# Patient Record
Sex: FEMALE | Race: WHITE | NOT HISPANIC OR LATINO | Employment: FULL TIME | URBAN - METROPOLITAN AREA
[De-identification: names, ages, dates, MRNs, and addresses within clinical notes are randomized per-mention and may not be internally consistent; named-entity substitution may affect disease eponyms.]

---

## 2017-03-13 ENCOUNTER — PATIENT MESSAGE (OUTPATIENT)
Dept: OBSTETRICS AND GYNECOLOGY | Facility: CLINIC | Age: 52
End: 2017-03-13

## 2017-04-21 ENCOUNTER — OFFICE VISIT (OUTPATIENT)
Dept: OBSTETRICS AND GYNECOLOGY | Facility: CLINIC | Age: 52
End: 2017-04-21
Payer: COMMERCIAL

## 2017-04-21 VITALS
HEIGHT: 62 IN | WEIGHT: 132 LBS | BODY MASS INDEX: 24.29 KG/M2 | DIASTOLIC BLOOD PRESSURE: 68 MMHG | SYSTOLIC BLOOD PRESSURE: 120 MMHG

## 2017-04-21 DIAGNOSIS — Z01.419 WELL WOMAN EXAM WITH ROUTINE GYNECOLOGICAL EXAM: Primary | ICD-10-CM

## 2017-04-21 DIAGNOSIS — N90.7 INCLUSION CYST OF VULVA: ICD-10-CM

## 2017-04-21 DIAGNOSIS — Z12.31 ENCOUNTER FOR SCREENING MAMMOGRAM FOR BREAST CANCER: ICD-10-CM

## 2017-04-21 PROCEDURE — 99396 PREV VISIT EST AGE 40-64: CPT | Mod: S$GLB,,, | Performed by: OBSTETRICS & GYNECOLOGY

## 2017-04-21 PROCEDURE — 99999 PR PBB SHADOW E&M-EST. PATIENT-LVL II: CPT | Mod: PBBFAC,,, | Performed by: OBSTETRICS & GYNECOLOGY

## 2017-04-21 PROCEDURE — 88175 CYTOPATH C/V AUTO FLUID REDO: CPT

## 2017-04-22 RX ORDER — PROGESTERONE 100 MG/1
100 CAPSULE ORAL NIGHTLY
Qty: 30 CAPSULE | Refills: 11 | Status: SHIPPED | OUTPATIENT
Start: 2017-04-22 | End: 2018-01-09 | Stop reason: SDUPTHER

## 2017-04-22 RX ORDER — ESTRADIOL 2 MG/1
2 TABLET ORAL DAILY
Qty: 30 TABLET | Refills: 11 | Status: SHIPPED | OUTPATIENT
Start: 2017-04-22 | End: 2018-01-09 | Stop reason: SDUPTHER

## 2017-04-25 NOTE — PROGRESS NOTES
HPI:  52 y.o. female patient  presents today for annual exam.  She is having very spaced out and light menses every 2-5 months.  She is using estrace and prometrium for HRT, which is controlling her symptoms well.   has had a vasectomy for contraception.  She is c/o vulvar cyst that has been present for a long time, but is causing irritation.      Past Medical History:   Diagnosis Date    Hypertension     Ovarian cyst        Past Surgical History:   Procedure Laterality Date    breast implants      COLON SURGERY      SINUS SURGERY         REVIEW OF SYSTEMS:  GENERAL:  No fever, chills, fatigue, or weight loss  ABDOMEN:  Normal appetite, no weight loss or abdominal pain  URINARY:  No flank pain, dysuria, or hematuria  REPRODUCTIVE:  No abnormal vaginal bleeding  BREASTS:  No tenderness, masses, or nipple discharge noted of breasts    PHYSICAL EXAM:    APPEARANCE:  Well nourished, well developed, in no acute distress  NECK:  Symmetric without masses or thyromegaly  BREASTS:  Symmetrical, no skin changes or visible lesions.  No palpable masses, nipple discharge, or adenopathy bilaterally.  ABDOMEN:  Soft, no tenderness or masses, no distension noted  PELVIC:  VULVA:  1 cm inclusion cyst present on left labia minora, lateral to clitoris.  No other lesions.  Normal female genitalia  URETHRAL MEATUS:  Normal size and location.  No lesions.  No prolapse  URETHRA:  No masses, tenderness, prolapse, or scarring  VAGINA:  No lesions or discharge.  No significant cystocele or rectocele  CERVIX:  No lesions.  Normal diameter, no cervical motion tenderness.  UTERUS:  4-6 week size, regular shape, mobile, non-tender, normal position, good support  ADNEXA:  No masses or tenderness  ANUS AND PERINEUM:  No lesions.  No external hemorrhoids    1. Well woman exam with routine gynecological exam  Liquid-based pap smear, screening   2. Encounter for screening mammogram for breast cancer  Mammo Digital Screening Bilat  with CAD   3. Inclusion cyst of vulva           PLAN:  Patient counseled on options of observation vs removal for inclusion cyst.  She desires removal of it, due to irritation in the area.  She will schedule an appt for removal of this.  Patient counseled regarding recommended routine health maintenance for her age.

## 2017-05-03 ENCOUNTER — HOSPITAL ENCOUNTER (OUTPATIENT)
Dept: RADIOLOGY | Facility: HOSPITAL | Age: 52
Discharge: HOME | End: 2017-05-03
Attending: OBSTETRICS & GYNECOLOGY
Payer: COMMERCIAL

## 2017-05-03 DIAGNOSIS — Z12.31 ENCOUNTER FOR SCREENING MAMMOGRAM FOR BREAST CANCER: ICD-10-CM

## 2017-05-03 PROCEDURE — 77067 SCR MAMMO BI INCL CAD: CPT | Mod: 26,,, | Performed by: RADIOLOGY

## 2017-05-03 PROCEDURE — 77067 SCR MAMMO BI INCL CAD: CPT | Mod: TC

## 2017-05-03 PROCEDURE — 77063 BREAST TOMOSYNTHESIS BI: CPT | Mod: 26,,, | Performed by: RADIOLOGY

## 2017-06-08 ENCOUNTER — PROCEDURE VISIT (OUTPATIENT)
Dept: OBSTETRICS AND GYNECOLOGY | Facility: CLINIC | Age: 52
End: 2017-06-08
Payer: COMMERCIAL

## 2017-06-08 VITALS
WEIGHT: 136.44 LBS | SYSTOLIC BLOOD PRESSURE: 120 MMHG | HEIGHT: 62 IN | BODY MASS INDEX: 25.11 KG/M2 | DIASTOLIC BLOOD PRESSURE: 78 MMHG

## 2017-06-08 DIAGNOSIS — D17.79 LIPOMA OF GROIN: Primary | ICD-10-CM

## 2017-06-08 DIAGNOSIS — N90.7 VULVAR CYST: ICD-10-CM

## 2017-06-08 PROCEDURE — 56605 BIOPSY OF VULVA/PERINEUM: CPT | Mod: S$GLB,,, | Performed by: OBSTETRICS & GYNECOLOGY

## 2017-06-08 PROCEDURE — 88304 TISSUE EXAM BY PATHOLOGIST: CPT | Performed by: PATHOLOGY

## 2017-06-08 PROCEDURE — 88304 TISSUE EXAM BY PATHOLOGIST: CPT | Mod: 26,,, | Performed by: PATHOLOGY

## 2017-06-08 RX ORDER — HYDROCODONE BITARTRATE AND ACETAMINOPHEN 5; 325 MG/1; MG/1
1 TABLET ORAL EVERY 6 HOURS PRN
Qty: 15 TABLET | Refills: 0 | Status: CANCELLED | OUTPATIENT
Start: 2017-06-08 | End: 2017-06-18

## 2017-06-08 RX ORDER — ACETAMINOPHEN AND CODEINE PHOSPHATE 300; 30 MG/1; MG/1
1 TABLET ORAL EVERY 6 HOURS
Qty: 10 TABLET | Refills: 0 | Status: SHIPPED | OUTPATIENT
Start: 2017-06-08 | End: 2019-02-26

## 2017-06-08 NOTE — PROCEDURES
Procedures     VULVAR CYST EXCISION:  Pre-Vulvar cyst I&D Counseling:  The patient was informed of the risk of bleeding, pain and infection.  She was counseled on the conservative treatment as well as operative outpatient management, and she agrees to proceed with in-office incision and drainage.    Exam:  There is a 1 cm cyst of the left labium majus.  Normal hair distribution.  Adequate perineal body and normal urethral meatus.  Vaginal moist and well-rugated.    Procedure:  A time out was performed.  The base of the cyst was injected with 2 ml of 1% lidocaine with epinephrine.  A stab wound was made into the cyst with a #11 blade, and a small hemostat was used to excise the cyst, which turned out to be a vulvar lipoma.  Silver nitrate was used to obtain good hemostasis.  The patient tolerated the procedure well.    Assessment:  Excision of a vulvar lipoma    Post excision of vulvar lipoma counseling:  The patient was counseled to manage pain with NSAIDs or hydrocodone.  She was advised to expect a bloody yellowish discharge for 24-48 hours.  The patient was advised to avoid vaginal intercourse, douching, and tampons for at least a week.  She was counseled to call for evaluation in the event of heavy bleeding, fever greater than 101.0F, or worsening pain and redness at the excision site.  Counseling lasted about 15 minutes, and all questions were answered.    Follow-up:  As needed.

## 2017-06-13 ENCOUNTER — PATIENT MESSAGE (OUTPATIENT)
Dept: OBSTETRICS AND GYNECOLOGY | Facility: CLINIC | Age: 52
End: 2017-06-13

## 2017-06-14 RX ORDER — AMOXICILLIN AND CLAVULANATE POTASSIUM 875; 125 MG/1; MG/1
1 TABLET, FILM COATED ORAL 2 TIMES DAILY
Qty: 14 TABLET | Refills: 0 | Status: SHIPPED | OUTPATIENT
Start: 2017-06-14 | End: 2017-06-21

## 2017-07-12 ENCOUNTER — PATIENT MESSAGE (OUTPATIENT)
Dept: OBSTETRICS AND GYNECOLOGY | Facility: CLINIC | Age: 52
End: 2017-07-12

## 2017-07-13 NOTE — TELEPHONE ENCOUNTER
Please schedule patient for follow up appt.  I can see her today or tomorrow if that works for her.

## 2017-07-21 ENCOUNTER — OFFICE VISIT (OUTPATIENT)
Dept: OBSTETRICS AND GYNECOLOGY | Facility: CLINIC | Age: 52
End: 2017-07-21
Payer: COMMERCIAL

## 2017-07-21 VITALS
HEIGHT: 62 IN | BODY MASS INDEX: 25.11 KG/M2 | DIASTOLIC BLOOD PRESSURE: 80 MMHG | WEIGHT: 136.44 LBS | SYSTOLIC BLOOD PRESSURE: 120 MMHG

## 2017-07-21 DIAGNOSIS — N90.89 VULVAR HEMATOMA: Primary | ICD-10-CM

## 2017-07-21 PROCEDURE — 99212 OFFICE O/P EST SF 10 MIN: CPT | Mod: S$GLB,,, | Performed by: OBSTETRICS & GYNECOLOGY

## 2017-07-21 PROCEDURE — 99999 PR PBB SHADOW E&M-EST. PATIENT-LVL II: CPT | Mod: PBBFAC,,, | Performed by: OBSTETRICS & GYNECOLOGY

## 2017-07-21 RX ORDER — KETOROLAC TROMETHAMINE 10 MG/1
10 TABLET, FILM COATED ORAL EVERY 8 HOURS
Qty: 15 TABLET | Refills: 0 | Status: SHIPPED | OUTPATIENT
Start: 2017-07-21 | End: 2017-07-26

## 2017-07-21 NOTE — PROGRESS NOTES
HPI:  52 y.o. female  presents today for follow up.  She had removal of vulvar lipoma on 17.  She has had pain and swelling of the area that has been persistent.  No other complaints.          REVIEW OF SYSTEMS:  GENERAL:  No fever, chills, fatigue, or weight loss  ABDOMEN:  Normal appetite, no weight loss or abdominal pain  URINARY:  No flank pain, dysuria, or hematuria  REPRODUCTIVE:  No abnormal vaginal bleeding  BREASTS:  No tenderness, masses, or nipple discharge noted of breasts    PHYSICAL EXAM:    APPEARANCE:  Well nourished, well developed, in no acute distress  ABDOMEN:  Soft, no tenderness or masses, no distension noted  PELVIC:  VULVA:  3 x 0.5 cm hematoma present just under scar on left mons. Otherwise normal female genitalia  URETHRAL MEATUS:  Normal size and location.  No lesions.  No prolapse  URETHRA:  No masses, tenderness, prolapse, or scarring  VAGINA:  No lesions or discharge.  No significant cystocoele or rectocoele  CERVIX:  No lesions.  Normal diameter, no cervical motion tenderness.  UTERUS:  4-6 week size, regular shape, mobile, non-tender, normal position, good support  ADNEXA:  No masses or tenderness  ANUS AND PERINEUM:  No lesions.  No external hemorrhoids    ASSESSMENT:  1. Vulvar hematoma           PLAN:  Toradol Rx.  Recommend application of heat to the hematoma.    Discussed with the patient and all questioned fully answered. She will call me if any problems arise.    The patient indicates understanding of these issues and agrees with the plan.

## 2018-01-09 RX ORDER — PROGESTERONE 100 MG/1
100 CAPSULE ORAL NIGHTLY
Qty: 90 CAPSULE | Refills: 1 | Status: SHIPPED | OUTPATIENT
Start: 2018-01-09 | End: 2018-04-27 | Stop reason: SDUPTHER

## 2018-01-09 RX ORDER — ESTRADIOL 2 MG/1
2 TABLET ORAL DAILY
Qty: 90 TABLET | Refills: 1 | Status: SHIPPED | OUTPATIENT
Start: 2018-01-09 | End: 2018-04-27

## 2018-04-27 ENCOUNTER — OFFICE VISIT (OUTPATIENT)
Dept: OBSTETRICS AND GYNECOLOGY | Facility: CLINIC | Age: 53
End: 2018-04-27
Payer: COMMERCIAL

## 2018-04-27 VITALS
HEIGHT: 62 IN | BODY MASS INDEX: 23.37 KG/M2 | DIASTOLIC BLOOD PRESSURE: 79 MMHG | SYSTOLIC BLOOD PRESSURE: 106 MMHG | WEIGHT: 127 LBS

## 2018-04-27 DIAGNOSIS — N95.1 MENOPAUSAL SYMPTOMS: ICD-10-CM

## 2018-04-27 DIAGNOSIS — Z01.419 WELL WOMAN EXAM WITH ROUTINE GYNECOLOGICAL EXAM: Primary | ICD-10-CM

## 2018-04-27 DIAGNOSIS — Z12.31 ENCOUNTER FOR SCREENING MAMMOGRAM FOR BREAST CANCER: ICD-10-CM

## 2018-04-27 PROCEDURE — 99999 PR PBB SHADOW E&M-EST. PATIENT-LVL III: CPT | Mod: PBBFAC,,, | Performed by: OBSTETRICS & GYNECOLOGY

## 2018-04-27 PROCEDURE — 99396 PREV VISIT EST AGE 40-64: CPT | Mod: S$GLB,,, | Performed by: OBSTETRICS & GYNECOLOGY

## 2018-04-27 RX ORDER — ESTRADIOL 0.1 MG/D
1 FILM, EXTENDED RELEASE TRANSDERMAL
Qty: 24 PATCH | Refills: 3 | Status: SHIPPED | OUTPATIENT
Start: 2018-04-30 | End: 2018-07-10

## 2018-04-27 RX ORDER — PROGESTERONE 100 MG/1
100 CAPSULE ORAL NIGHTLY
Qty: 90 CAPSULE | Refills: 3 | Status: SHIPPED | OUTPATIENT
Start: 2018-04-27 | End: 2018-12-20

## 2018-04-27 NOTE — PROGRESS NOTES
HPI:  53 y.o. female patient  presents today for annual exam.  No complaints, except hot flashes.  No menses for past year.  She is currently on estrace 2 mg daily along with prometrium 100 mg qhs.  She has lost 20 pounds and exercises regularly.  No other concerns.      Past Medical History:   Diagnosis Date    Hypertension     Ovarian cyst        Past Surgical History:   Procedure Laterality Date    breast implants      COLON SURGERY      SINUS SURGERY         REVIEW OF SYSTEMS:  GENERAL:  No fever, chills, fatigue, or weight loss  ABDOMEN:  Normal appetite, no weight loss or abdominal pain  URINARY:  No flank pain, dysuria, or hematuria  REPRODUCTIVE:  No abnormal vaginal bleeding  BREASTS:  No tenderness, masses, or nipple discharge noted of breasts    PHYSICAL EXAM:    APPEARANCE:  Well nourished, well developed, in no acute distress  NECK:  Symmetric without masses or thyromegaly  BREASTS:  Symmetrical, no skin changes or visible lesions.  No palpable masses, nipple discharge, or adenopathy bilaterally.  ABDOMEN:  Soft, no tenderness or masses, no distension noted  PELVIC:  VULVA:  No lesions.  Normal female genitalia  URETHRAL MEATUS:  Normal size and location.  No lesions.  No prolapse  URETHRA:  No masses, tenderness, prolapse, or scarring  VAGINA:  No lesions or discharge.  No significant cystocele or rectocele  CERVIX:  No lesions.  Normal diameter, no cervical motion tenderness.  UTERUS:  4-6 week size, regular shape, mobile, non-tender, normal position, good support  ADNEXA:  No masses or tenderness  ANUS AND PERINEUM:  No lesions.  No external hemorrhoids    1. Well woman exam with routine gynecological exam     2. Encounter for screening mammogram for breast cancer  Mammo Digital Screening Bilat with CAD   3. Menopausal symptoms           PLAN:  MMG ordered  Discussed HRT options with patient.  Risks vs benefits of HRT discussed.  Will try switching her to transdermal patch to see if this  will improve hot flashes.  Patient counseled regarding recommended routine health maintenance for her age.

## 2018-05-21 ENCOUNTER — HOSPITAL ENCOUNTER (OUTPATIENT)
Dept: RADIOLOGY | Facility: HOSPITAL | Age: 53
Discharge: HOME | End: 2018-05-21
Attending: INTERNAL MEDICINE
Payer: COMMERCIAL

## 2018-05-21 VITALS — BODY MASS INDEX: 23.37 KG/M2 | HEIGHT: 62 IN | WEIGHT: 127 LBS

## 2018-05-21 DIAGNOSIS — Z12.31 ENCOUNTER FOR SCREENING MAMMOGRAM FOR BREAST CANCER: ICD-10-CM

## 2018-05-21 PROCEDURE — 77067 SCR MAMMO BI INCL CAD: CPT | Mod: 26,,, | Performed by: RADIOLOGY

## 2018-05-21 PROCEDURE — 77063 BREAST TOMOSYNTHESIS BI: CPT | Mod: 26,,, | Performed by: RADIOLOGY

## 2018-05-21 PROCEDURE — 77067 SCR MAMMO BI INCL CAD: CPT | Mod: TC,PO

## 2018-07-09 ENCOUNTER — PATIENT MESSAGE (OUTPATIENT)
Dept: OBSTETRICS AND GYNECOLOGY | Facility: CLINIC | Age: 53
End: 2018-07-09

## 2018-07-09 RX ORDER — PROGESTERONE 100 MG/1
CAPSULE ORAL
Qty: 90 CAPSULE | Refills: 3 | Status: SHIPPED | OUTPATIENT
Start: 2018-07-09 | End: 2018-12-20

## 2018-07-10 RX ORDER — ESTRADIOL 2 MG/1
2 TABLET ORAL DAILY
Qty: 30 TABLET | Refills: 11 | Status: SHIPPED | OUTPATIENT
Start: 2018-07-10 | End: 2019-02-26 | Stop reason: SDUPTHER

## 2018-09-25 RX ORDER — ESTRADIOL 2 MG/1
TABLET ORAL
Qty: 90 TABLET | Refills: 1 | Status: SHIPPED | OUTPATIENT
Start: 2018-09-25 | End: 2019-03-20 | Stop reason: SDUPTHER

## 2018-11-22 ENCOUNTER — PATIENT MESSAGE (OUTPATIENT)
Dept: OBSTETRICS AND GYNECOLOGY | Facility: CLINIC | Age: 53
End: 2018-11-22

## 2018-11-22 DIAGNOSIS — N95.0 POSTMENOPAUSAL BLEEDING: Primary | ICD-10-CM

## 2018-12-04 ENCOUNTER — HOSPITAL ENCOUNTER (OUTPATIENT)
Dept: RADIOLOGY | Facility: HOSPITAL | Age: 53
Discharge: HOME | End: 2018-12-04
Attending: OBSTETRICS & GYNECOLOGY
Payer: COMMERCIAL

## 2018-12-04 DIAGNOSIS — N95.0 POSTMENOPAUSAL BLEEDING: ICD-10-CM

## 2018-12-04 PROCEDURE — 76830 TRANSVAGINAL US NON-OB: CPT | Mod: TC

## 2018-12-04 PROCEDURE — 76830 TRANSVAGINAL US NON-OB: CPT | Mod: 26,,, | Performed by: RADIOLOGY

## 2018-12-04 PROCEDURE — 76856 US EXAM PELVIC COMPLETE: CPT | Mod: TC

## 2018-12-04 PROCEDURE — 76856 US EXAM PELVIC COMPLETE: CPT | Mod: 26,,, | Performed by: RADIOLOGY

## 2018-12-20 ENCOUNTER — PATIENT MESSAGE (OUTPATIENT)
Dept: OBSTETRICS AND GYNECOLOGY | Facility: CLINIC | Age: 53
End: 2018-12-20

## 2018-12-20 RX ORDER — PROGESTERONE 200 MG/1
200 CAPSULE ORAL NIGHTLY
Qty: 90 CAPSULE | Refills: 2 | Status: SHIPPED | OUTPATIENT
Start: 2018-12-20 | End: 2019-09-24 | Stop reason: SDUPTHER

## 2019-02-26 ENCOUNTER — OFFICE VISIT (OUTPATIENT)
Dept: OBSTETRICS AND GYNECOLOGY | Facility: CLINIC | Age: 54
End: 2019-02-26
Payer: COMMERCIAL

## 2019-02-26 ENCOUNTER — PATIENT MESSAGE (OUTPATIENT)
Dept: OBSTETRICS AND GYNECOLOGY | Facility: CLINIC | Age: 54
End: 2019-02-26

## 2019-02-26 VITALS
DIASTOLIC BLOOD PRESSURE: 86 MMHG | SYSTOLIC BLOOD PRESSURE: 130 MMHG | BODY MASS INDEX: 25.44 KG/M2 | HEIGHT: 62 IN | WEIGHT: 138.25 LBS

## 2019-02-26 DIAGNOSIS — N39.0 URINARY TRACT INFECTION WITHOUT HEMATURIA, SITE UNSPECIFIED: Primary | ICD-10-CM

## 2019-02-26 LAB
BACTERIA #/AREA URNS HPF: ABNORMAL /HPF
BILIRUB UR QL STRIP: NEGATIVE
CLARITY UR: ABNORMAL
COLOR UR: ABNORMAL
GLUCOSE UR QL STRIP: NEGATIVE
HGB UR QL STRIP: ABNORMAL
KETONES UR QL STRIP: NEGATIVE
LEUKOCYTE ESTERASE UR QL STRIP: ABNORMAL
MICROSCOPIC COMMENT: ABNORMAL
NITRITE UR QL STRIP: NEGATIVE
PH UR STRIP: 6 [PH] (ref 5–8)
PROT UR QL STRIP: NEGATIVE
RBC #/AREA URNS HPF: 1 /HPF (ref 0–4)
SP GR UR STRIP: <=1.005 (ref 1–1.03)
URN SPEC COLLECT METH UR: ABNORMAL
WBC #/AREA URNS HPF: 25 /HPF (ref 0–5)
YEAST URNS QL MICRO: ABNORMAL

## 2019-02-26 PROCEDURE — 81002 URINALYSIS NONAUTO W/O SCOPE: CPT | Mod: S$GLB,,, | Performed by: NURSE PRACTITIONER

## 2019-02-26 PROCEDURE — 3008F BODY MASS INDEX DOCD: CPT | Mod: CPTII,S$GLB,, | Performed by: NURSE PRACTITIONER

## 2019-02-26 PROCEDURE — 99213 PR OFFICE/OUTPT VISIT, EST, LEVL III, 20-29 MIN: ICD-10-PCS | Mod: 25,S$GLB,, | Performed by: NURSE PRACTITIONER

## 2019-02-26 PROCEDURE — 81002 PR URINALYSIS NONAUTO W/O SCOPE: ICD-10-PCS | Mod: S$GLB,,, | Performed by: NURSE PRACTITIONER

## 2019-02-26 PROCEDURE — 87186 SC STD MICRODIL/AGAR DIL: CPT

## 2019-02-26 PROCEDURE — 99999 PR PBB SHADOW E&M-EST. PATIENT-LVL III: ICD-10-PCS | Mod: PBBFAC,,, | Performed by: NURSE PRACTITIONER

## 2019-02-26 PROCEDURE — 87088 URINE BACTERIA CULTURE: CPT

## 2019-02-26 PROCEDURE — 81000 URINALYSIS NONAUTO W/SCOPE: CPT

## 2019-02-26 PROCEDURE — 87086 URINE CULTURE/COLONY COUNT: CPT

## 2019-02-26 PROCEDURE — 87077 CULTURE AEROBIC IDENTIFY: CPT

## 2019-02-26 PROCEDURE — 99213 OFFICE O/P EST LOW 20 MIN: CPT | Mod: 25,S$GLB,, | Performed by: NURSE PRACTITIONER

## 2019-02-26 PROCEDURE — 99999 PR PBB SHADOW E&M-EST. PATIENT-LVL III: CPT | Mod: PBBFAC,,, | Performed by: NURSE PRACTITIONER

## 2019-02-26 PROCEDURE — 3008F PR BODY MASS INDEX (BMI) DOCUMENTED: ICD-10-PCS | Mod: CPTII,S$GLB,, | Performed by: NURSE PRACTITIONER

## 2019-02-26 RX ORDER — NITROFURANTOIN 25; 75 MG/1; MG/1
100 CAPSULE ORAL 2 TIMES DAILY
Qty: 14 CAPSULE | Refills: 0 | Status: SHIPPED | OUTPATIENT
Start: 2019-02-26 | End: 2019-03-05

## 2019-02-26 NOTE — PROGRESS NOTES
"Juju Cruz is a 53 y.o. female  presents with complaint of urgency with urine, burning and frequency for about 3 days.      Past Medical History:   Diagnosis Date    Hypertension     Ovarian cyst      Past Surgical History:   Procedure Laterality Date    breast implants      BREAST SURGERY Bilateral     COLON SURGERY      SINUS SURGERY       Social History     Tobacco Use    Smoking status: Never Smoker    Smokeless tobacco: Never Used   Substance Use Topics    Alcohol use: Yes     Comment: socially    Drug use: No     Family History   Problem Relation Age of Onset    Ovarian cancer Paternal Grandmother     Hypertension Mother      OB History    Para Term  AB Living   1       1 0   SAB TAB Ectopic Multiple Live Births                  # Outcome Date GA Lbr Angel/2nd Weight Sex Delivery Anes PTL Lv   1 AB                   /86   Ht 5' 2" (1.575 m)   Wt 62.7 kg (138 lb 3.7 oz)   LMP 2015   BMI 25.28 kg/m²     ROS:  Per hpi    PHYSICAL EXAM:  examination not indicated    ASSESSMENT and PLAN:  1. Urinary tract infection without hematuria, site unspecified  nitrofurantoin, macrocrystal-monohydrate, (MACROBID) 100 MG capsule    POCT urine dipstick without microscope    Urinalysis    Urine culture       Patient was counseled today on uti triggers  ua dip with 2+ leukocytes and trace protein  Urine to lab     "

## 2019-02-28 LAB — BACTERIA UR CULT: NORMAL

## 2019-03-22 RX ORDER — ESTRADIOL 2 MG/1
TABLET ORAL
Qty: 90 TABLET | Refills: 1 | Status: SHIPPED | OUTPATIENT
Start: 2019-03-22 | End: 2019-09-24 | Stop reason: SDUPTHER

## 2019-06-14 ENCOUNTER — OFFICE VISIT (OUTPATIENT)
Dept: OBSTETRICS AND GYNECOLOGY | Facility: CLINIC | Age: 54
End: 2019-06-14
Payer: COMMERCIAL

## 2019-06-14 VITALS
WEIGHT: 136.25 LBS | DIASTOLIC BLOOD PRESSURE: 78 MMHG | SYSTOLIC BLOOD PRESSURE: 118 MMHG | BODY MASS INDEX: 24.92 KG/M2

## 2019-06-14 DIAGNOSIS — N95.1 MENOPAUSAL SYMPTOMS: ICD-10-CM

## 2019-06-14 DIAGNOSIS — Z12.31 ENCOUNTER FOR SCREENING MAMMOGRAM FOR BREAST CANCER: ICD-10-CM

## 2019-06-14 DIAGNOSIS — Z01.419 WELL WOMAN EXAM WITH ROUTINE GYNECOLOGICAL EXAM: Primary | ICD-10-CM

## 2019-06-14 PROCEDURE — 99396 PR PREVENTIVE VISIT,EST,40-64: ICD-10-PCS | Mod: S$GLB,,, | Performed by: OBSTETRICS & GYNECOLOGY

## 2019-06-14 PROCEDURE — 99999 PR PBB SHADOW E&M-EST. PATIENT-LVL II: CPT | Mod: PBBFAC,,, | Performed by: OBSTETRICS & GYNECOLOGY

## 2019-06-14 PROCEDURE — 99999 PR PBB SHADOW E&M-EST. PATIENT-LVL II: ICD-10-PCS | Mod: PBBFAC,,, | Performed by: OBSTETRICS & GYNECOLOGY

## 2019-06-14 PROCEDURE — 99396 PREV VISIT EST AGE 40-64: CPT | Mod: S$GLB,,, | Performed by: OBSTETRICS & GYNECOLOGY

## 2019-06-14 NOTE — PROGRESS NOTES
HPI:  54 y.o. female patient  presents today for annual exam.  She is  and is on Estrace 2 mg and prometrium 200 mg for HRT, which is working well for her.  No significant menopausal symptoms when on HRT.  C/o small bump at upper left vulva, near previous excision site.  No other complaints.  Exercises regularly.  Dexa will be done by PCP.  Needs MMG.      Past Medical History:   Diagnosis Date    Hypertension     Ovarian cyst        Past Surgical History:   Procedure Laterality Date    breast implants      BREAST SURGERY Bilateral     COLON SURGERY      SINUS SURGERY         REVIEW OF SYSTEMS:  GENERAL:  No fever, chills, fatigue, or weight loss  ABDOMEN:  Normal appetite, no weight loss or abdominal pain  URINARY:  No flank pain, dysuria, or hematuria  REPRODUCTIVE:  No abnormal vaginal bleeding  BREASTS:  No tenderness, masses, or nipple discharge noted of breasts    PHYSICAL EXAM:    APPEARANCE:  Well nourished, well developed, in no acute distress  NECK:  Symmetric without masses or thyromegaly  BREASTS:  Symmetrical, no skin changes or visible lesions.  No palpable masses, nipple discharge, or adenopathy bilaterally.  ABDOMEN:  Soft, no tenderness or masses, no distension noted  PELVIC:  VULVA:  No lesions.  Normal female genitalia  URETHRAL MEATUS:  Normal size and location.  No lesions.  No prolapse  URETHRA:  No masses, tenderness, prolapse, or scarring  VAGINA:  No lesions or discharge.  No significant cystocele or rectocele  CERVIX:  No lesions.  Normal diameter, no cervical motion tenderness.  UTERUS:  4-6 week size, regular shape, mobile, non-tender, normal position, good support  ADNEXA:  No masses or tenderness  ANUS AND PERINEUM:  No lesions.  No external hemorrhoids    1. Well woman exam with routine gynecological exam     2. Encounter for screening mammogram for breast cancer  Mammo Digital Screening Bilat   3. Menopausal symptoms           PLAN:  MMG ordered.  Patient counseled  regarding recommended routine health maintenance for her age.

## 2019-07-09 ENCOUNTER — HOSPITAL ENCOUNTER (OUTPATIENT)
Dept: RADIOLOGY | Facility: HOSPITAL | Age: 54
Discharge: HOME | End: 2019-07-09
Attending: OBSTETRICS & GYNECOLOGY
Payer: COMMERCIAL

## 2019-07-09 VITALS — WEIGHT: 136 LBS | HEIGHT: 62 IN | BODY MASS INDEX: 25.03 KG/M2

## 2019-07-09 DIAGNOSIS — Z12.31 ENCOUNTER FOR SCREENING MAMMOGRAM FOR BREAST CANCER: ICD-10-CM

## 2019-07-09 PROCEDURE — 77067 SCR MAMMO BI INCL CAD: CPT | Mod: 26,,, | Performed by: RADIOLOGY

## 2019-07-09 PROCEDURE — 77063 MAMMO DIGITAL SCREENING BILAT WITH TOMOSYNTHESIS_CAD: ICD-10-PCS | Mod: 26,,, | Performed by: RADIOLOGY

## 2019-07-09 PROCEDURE — 77067 MAMMO DIGITAL SCREENING BILAT WITH TOMOSYNTHESIS_CAD: ICD-10-PCS | Mod: 26,,, | Performed by: RADIOLOGY

## 2019-07-09 PROCEDURE — 77067 SCR MAMMO BI INCL CAD: CPT | Mod: TC

## 2019-07-09 PROCEDURE — 77063 BREAST TOMOSYNTHESIS BI: CPT | Mod: 26,,, | Performed by: RADIOLOGY

## 2019-09-24 RX ORDER — ESTRADIOL 2 MG/1
2 TABLET ORAL DAILY
Qty: 90 TABLET | Refills: 2 | Status: SHIPPED | OUTPATIENT
Start: 2019-09-24 | End: 2020-06-29

## 2019-09-24 RX ORDER — PROGESTERONE 200 MG/1
200 CAPSULE ORAL NIGHTLY
Qty: 90 CAPSULE | Refills: 2 | Status: SHIPPED | OUTPATIENT
Start: 2019-09-24 | End: 2020-06-29

## 2020-05-14 ENCOUNTER — TELEPHONE (OUTPATIENT)
Dept: OBSTETRICS AND GYNECOLOGY | Facility: CLINIC | Age: 55
End: 2020-05-14

## 2020-05-14 NOTE — TELEPHONE ENCOUNTER
----- Message from Bret Sharp sent at 5/14/2020  9:42 AM CDT -----  Contact: Patient   Patient called in and wanted to speak with the office regarding scheduling her appointment. She would like a call back from the office and can be reached at    [phone]

## 2020-06-12 ENCOUNTER — OFFICE VISIT (OUTPATIENT)
Dept: OBSTETRICS AND GYNECOLOGY | Facility: CLINIC | Age: 55
End: 2020-06-12
Payer: COMMERCIAL

## 2020-06-12 VITALS
BODY MASS INDEX: 24.59 KG/M2 | SYSTOLIC BLOOD PRESSURE: 118 MMHG | HEIGHT: 62 IN | WEIGHT: 133.63 LBS | DIASTOLIC BLOOD PRESSURE: 88 MMHG

## 2020-06-12 DIAGNOSIS — Z12.31 ENCOUNTER FOR SCREENING MAMMOGRAM FOR BREAST CANCER: ICD-10-CM

## 2020-06-12 DIAGNOSIS — Z01.419 WELL WOMAN EXAM WITH ROUTINE GYNECOLOGICAL EXAM: Primary | ICD-10-CM

## 2020-06-12 DIAGNOSIS — N95.1 MENOPAUSAL SYMPTOMS: ICD-10-CM

## 2020-06-12 PROCEDURE — 99999 PR PBB SHADOW E&M-EST. PATIENT-LVL III: ICD-10-PCS | Mod: PBBFAC,,, | Performed by: OBSTETRICS & GYNECOLOGY

## 2020-06-12 PROCEDURE — 88175 CYTOPATH C/V AUTO FLUID REDO: CPT

## 2020-06-12 PROCEDURE — 99396 PR PREVENTIVE VISIT,EST,40-64: ICD-10-PCS | Mod: S$GLB,,, | Performed by: OBSTETRICS & GYNECOLOGY

## 2020-06-12 PROCEDURE — 87624 HPV HI-RISK TYP POOLED RSLT: CPT

## 2020-06-12 PROCEDURE — 99396 PREV VISIT EST AGE 40-64: CPT | Mod: S$GLB,,, | Performed by: OBSTETRICS & GYNECOLOGY

## 2020-06-12 PROCEDURE — 99999 PR PBB SHADOW E&M-EST. PATIENT-LVL III: CPT | Mod: PBBFAC,,, | Performed by: OBSTETRICS & GYNECOLOGY

## 2020-06-12 RX ORDER — ESTRADIOL 0.1 MG/G
1 CREAM VAGINAL
Qty: 42.5 G | Refills: 1 | Status: SHIPPED | OUTPATIENT
Start: 2020-06-15 | End: 2021-06-15

## 2020-06-12 RX ORDER — PROGESTERONE 200 MG/1
CAPSULE ORAL
COMMUNITY
Start: 2019-06-06 | End: 2020-06-12

## 2020-06-12 RX ORDER — ESTRADIOL 2 MG/1
TABLET ORAL
COMMUNITY
End: 2020-06-12

## 2020-06-12 RX ORDER — DICLOFENAC SODIUM 10 MG/G
2 GEL TOPICAL
COMMUNITY
Start: 2019-12-12

## 2020-06-12 RX ORDER — GABAPENTIN 300 MG/1
CAPSULE ORAL
COMMUNITY
Start: 2019-07-09

## 2020-06-12 RX ORDER — AMLODIPINE BESYLATE 2.5 MG/1
TABLET ORAL
COMMUNITY
Start: 2019-06-01

## 2020-06-12 RX ORDER — GABAPENTIN 400 MG/1
CAPSULE ORAL
COMMUNITY
End: 2020-06-12

## 2020-06-12 NOTE — PROGRESS NOTES
HPI:  55 y.o. female patient  presents today for annual exam.  She is c/o a very sensitive area on her clitoris that is painful with stimulation during intercourse.  This is a recent change over the past 6 months.  She is  and is on Estrace and prometrium for HRT.  No other complaints.  Her HRT seems to be working well for her symptoms.      Past Medical History:   Diagnosis Date    Hypertension     Ovarian cyst        Past Surgical History:   Procedure Laterality Date    AUGMENTATION OF BREAST      Saline    breast implants      BREAST SURGERY Bilateral     COLON SURGERY      SINUS SURGERY         REVIEW OF SYSTEMS:  GENERAL:  No fever, chills, fatigue, or weight loss  ABDOMEN:  Normal appetite, no weight loss or abdominal pain  URINARY:  No flank pain, dysuria, or hematuria  REPRODUCTIVE:  No abnormal vaginal bleeding  BREASTS:  No tenderness, masses, or nipple discharge noted of breasts    PHYSICAL EXAM:    APPEARANCE:  Well nourished, well developed, in no acute distress  NECK:  Symmetric without masses or thyromegaly  BREASTS:  Symmetrical, no skin changes or visible lesions.  No palpable masses, nipple discharge, or adenopathy bilaterally.  ABDOMEN:  Soft, no tenderness or masses, no distension noted  PELVIC:  VULVA:  No lesions.  Normal female genitalia  URETHRAL MEATUS:  Normal size and location.  No lesions.  No prolapse  URETHRA:  No masses, tenderness, prolapse, or scarring  VAGINA:  No lesions or discharge.  No significant cystocele or rectocele  CERVIX:  No lesions.  Normal diameter, no cervical motion tenderness.  UTERUS:  4-6 week size, regular shape, mobile, non-tender, normal position, good support  ADNEXA:  No masses or tenderness  ANUS AND PERINEUM:  No lesions.  No external hemorrhoids    1. Well woman exam with routine gynecological exam  Liquid-Based Pap Smear, Screening    HPV High Risk Genotypes, PCR   2. Menopausal symptoms     3. Encounter for screening mammogram for  breast cancer  Mammo Digital Screening Bilat w/ Mitch         PLAN:  MMG annually  Rx for Estrace vaginal cream  Continue HRT  Patient counseled regarding risks, benefits, and side effects of HRT.  She was advised of potential slight increased risks of blood clots, stroke, and breast cancer with long term use of HRT.  She was also advised of potential side effects of the medication.  Patient counseled regarding recommended routine health maintenance for her age.

## 2020-06-19 LAB
FINAL PATHOLOGIC DIAGNOSIS: NORMAL
HPV HR 12 DNA SPEC QL NAA+PROBE: NEGATIVE
HPV16 AG SPEC QL: NEGATIVE
HPV18 DNA SPEC QL NAA+PROBE: NEGATIVE
Lab: NORMAL

## 2020-06-29 RX ORDER — ESTRADIOL 2 MG/1
2 TABLET ORAL DAILY
Qty: 90 TABLET | Refills: 2 | Status: CANCELLED | OUTPATIENT
Start: 2020-06-29

## 2020-06-29 RX ORDER — PROGESTERONE 200 MG/1
CAPSULE ORAL
Qty: 90 CAPSULE | Refills: 2 | Status: CANCELLED | OUTPATIENT
Start: 2020-06-29

## 2020-06-30 RX ORDER — PROGESTERONE 200 MG/1
200 CAPSULE ORAL NIGHTLY
Qty: 90 CAPSULE | Refills: 2 | Status: CANCELLED | OUTPATIENT
Start: 2020-06-30 | End: 2021-06-30

## 2020-06-30 RX ORDER — ESTRADIOL 2 MG/1
2 TABLET ORAL DAILY
Qty: 90 TABLET | Refills: 2 | Status: CANCELLED | OUTPATIENT
Start: 2020-06-30

## 2020-07-13 ENCOUNTER — HOSPITAL ENCOUNTER (OUTPATIENT)
Dept: RADIOLOGY | Facility: HOSPITAL | Age: 55
Discharge: HOME | End: 2020-07-13
Attending: OBSTETRICS & GYNECOLOGY
Payer: COMMERCIAL

## 2020-07-13 VITALS — BODY MASS INDEX: 24.48 KG/M2 | HEIGHT: 62 IN | WEIGHT: 133 LBS

## 2020-07-13 DIAGNOSIS — Z12.31 ENCOUNTER FOR SCREENING MAMMOGRAM FOR BREAST CANCER: ICD-10-CM

## 2020-07-13 PROCEDURE — 77063 BREAST TOMOSYNTHESIS BI: CPT | Mod: 26,,, | Performed by: RADIOLOGY

## 2020-07-13 PROCEDURE — 77067 SCR MAMMO BI INCL CAD: CPT | Mod: 26,,, | Performed by: RADIOLOGY

## 2020-07-13 PROCEDURE — 77067 MAMMO DIGITAL SCREENING BILAT WITH TOMOSYNTHESIS_CAD: ICD-10-PCS | Mod: 26,,, | Performed by: RADIOLOGY

## 2020-07-13 PROCEDURE — 77067 SCR MAMMO BI INCL CAD: CPT | Mod: TC

## 2020-07-13 PROCEDURE — 77063 MAMMO DIGITAL SCREENING BILAT WITH TOMOSYNTHESIS_CAD: ICD-10-PCS | Mod: 26,,, | Performed by: RADIOLOGY

## 2021-04-28 ENCOUNTER — PATIENT MESSAGE (OUTPATIENT)
Dept: RESEARCH | Facility: HOSPITAL | Age: 56
End: 2021-04-28

## 2024-10-15 PROBLEM — F32.A DEPRESSION: Status: ACTIVE | Noted: 2024-10-15

## 2024-10-15 PROBLEM — I10 HYPERTENSION: Status: ACTIVE | Noted: 2024-10-15

## 2024-10-15 PROBLEM — F31.9 BIPOLAR DISORDER: Status: ACTIVE | Noted: 2024-10-15

## 2024-10-15 PROBLEM — F41.9 ANXIETY DISORDER: Status: ACTIVE | Noted: 2024-10-15

## 2025-03-27 ENCOUNTER — OFFICE VISIT (OUTPATIENT)
Dept: ORTHOPEDICS | Facility: CLINIC | Age: 60
End: 2025-03-27
Payer: COMMERCIAL

## 2025-03-27 VITALS — BODY MASS INDEX: 25.97 KG/M2 | WEIGHT: 141.13 LBS | HEIGHT: 62 IN

## 2025-03-27 DIAGNOSIS — M65.4 DE QUERVAIN'S TENOSYNOVITIS, RIGHT: Primary | ICD-10-CM

## 2025-03-27 PROCEDURE — 20550 NJX 1 TENDON SHEATH/LIGAMENT: CPT | Mod: RT,S$GLB,, | Performed by: ORTHOPAEDIC SURGERY

## 2025-03-27 PROCEDURE — 99214 OFFICE O/P EST MOD 30 MIN: CPT | Mod: 25,S$GLB,, | Performed by: ORTHOPAEDIC SURGERY

## 2025-03-27 PROCEDURE — 3008F BODY MASS INDEX DOCD: CPT | Mod: CPTII,S$GLB,, | Performed by: ORTHOPAEDIC SURGERY

## 2025-03-27 PROCEDURE — 99999 PR PBB SHADOW E&M-EST. PATIENT-LVL III: CPT | Mod: PBBFAC,,, | Performed by: ORTHOPAEDIC SURGERY

## 2025-03-27 PROCEDURE — 1159F MED LIST DOCD IN RCRD: CPT | Mod: CPTII,S$GLB,, | Performed by: ORTHOPAEDIC SURGERY

## 2025-03-27 RX ORDER — BETAMETHASONE SODIUM PHOSPHATE AND BETAMETHASONE ACETATE 3; 3 MG/ML; MG/ML
6 INJECTION, SUSPENSION INTRA-ARTICULAR; INTRALESIONAL; INTRAMUSCULAR; SOFT TISSUE
Status: DISCONTINUED | OUTPATIENT
Start: 2025-03-27 | End: 2025-03-27 | Stop reason: HOSPADM

## 2025-03-27 RX ADMIN — BETAMETHASONE SODIUM PHOSPHATE AND BETAMETHASONE ACETATE 6 MG: 3; 3 INJECTION, SUSPENSION INTRA-ARTICULAR; INTRALESIONAL; INTRAMUSCULAR; SOFT TISSUE at 01:03

## 2025-03-27 NOTE — PROCEDURES
Tendon Sheath    Date/Time: 3/27/2025 1:45 PM    Performed by: Campos Madsen MD  Authorized by: Campos Madsen MD    Consent Done?:  Yes (Verbal)  Indications:  Pain  Site marked: the procedure site was marked    Timeout: prior to procedure the correct patient, procedure, and site was verified    Prep: patient was prepped and draped in usual sterile fashion      Local anesthesia used?: Yes    Local anesthetic:  Lidocaine 1% without epinephrine  Anesthetic total (ml):  1    Location:  Wrist  Site:  R first doral compartment  Ultrasonic guidance for needle placement?: No    Needle size:  27 G  Approach:  Radial  Medications:  6 mg betamethasone acetate-betamethasone sodium phosphate 6 mg/mL  Patient tolerance:  Patient tolerated the procedure well with no immediate complications    Additional Comments: I have explained the risks, benefits, and alternatives of the procedure in detail.  The patient voices understanding and all questions have been answered.  The patient agrees to proceed as planned. After sterile prep the right  1st dorsal compartment was injected while being aware of the relevant neurovascular structures with 6mg celestone and 1mL of 1% lidocaine with a 27g needle. The patient tolerated the injection well. The patient understands that immediate relief of pain is secondary to the local anesthetic and will be temporary.  After the anesthetic wears off there may be a increase in pain that may last for a few hours or a few days and they should use ice to help alleviate this flair up of pain.

## 2025-03-27 NOTE — PROGRESS NOTES
JANINA Madsen M.D.  Orthopaedic Hand and Wrist Surgery  [address]    Patient ID: Juju Cruz  YOB: 1965  MRN: [id number]    Provider Note/Medical Decision Makin. De Quervain's tenosynovitis, right  Assessment & Plan:  The patient and I talked at length about the natural history and pathophysiology of right de Quervain, she understands that this is a chronic problem which may have acute episodic exacerbations.   Symptoms may resolve, worsen and even become permanent.  The patient understands the treatment options including observation, activity modification, therapy, NSAIDs, splints, injections and the surgical options including right 1st dorsal compartment release.  We discussed the risks of the diagnosis and the treatment options including pain, infection, bleeding, damage to nerves and vessels, stiffness, scarring, incomplete relief or recurrence of symptoms, poor pain and functional outcomes.  Unique risks of this diagnosis and the treatment include recurrence and tendon subluxation.  The patients treatment is further complicated by underlying CMC arthritis.  The patient has elected to proceed with right 1st compartment injection and bracing and we will follow up in 6 weeks if need be.             Chief Complaint: Pain of the Right Hand and Pain of the Right Wrist      Referred By: Self,Aaareferral    History of Present Illness: Juju Cruz is a 60 y.o. female here today for evaluation of right radial sided wrist pain she denies any history of injury it has been going on for about 3 months now it is difficult for her to lift it bothers her at night she has no some swelling over the radial side of the right wrist as well her thumb CMC arthritis is doing well.      Patient was queried and this is the extent of the patients current complaints today.    Past Medical History:     Estimated body mass index is 25.81 kg/m² as calculated from the following:    Height as  "of this encounter: 5' 2" (1.575 m).    Weight as of this encounter: 64 kg (141 lb 1.5 oz).  Past Medical History:   Diagnosis Date    Bipolar disorder, unspecified     Depression     Hypertension     Menopause     Ovarian cyst      Past Surgical History:   Procedure Laterality Date    AUGMENTATION OF BREAST  2001    Saline    breast implants      BREAST SURGERY Bilateral     COLON SURGERY  2014    HYSTEROSCOPY  08/10/2021    myosure polypectomy    SINUS SURGERY      SMALL INTESTINE SURGERY      diverticulitis     Family History   Problem Relation Name Age of Onset    Hypertension Paternal Grandfather      Prostate cancer Paternal Grandfather      Ovarian cancer Paternal Grandmother      Breast cancer Paternal Grandmother      Colon cancer Paternal Grandmother      Asthma Maternal Grandmother      Prostate cancer Father      Colon cancer Father      Hypertension Mother      Hyperlipidemia Mother       Social History[1]  Medication List with Changes/Refills   Current Medications    AMLODIPINE (NORVASC) 2.5 MG TABLET    TK 1 T PO D    CLOBETASOL 0.05% (TEMOVATE) 0.05 % OINT    Apply externally twice daily for 2 weeks then daily for 1 week then every other day for 1 week.    GABAPENTIN (NEURONTIN) 300 MG CAPSULE    TK 1 C PO ATN    ROPINIROLE (REQUIP) 0.25 MG TABLET    Take 0.25 mg by mouth.    ROSUVASTATIN (CRESTOR) 10 MG TABLET    Take 1 tablet by mouth every evening.     Review of patient's allergies indicates:   Allergen Reactions    Hydrocodone-acetaminophen Shortness Of Breath and Anaphylaxis     Other reaction(s): drug allergy, Unknown     ROS    Physical Exam:   GENERAL: Well appearing, appropriate for stated age, no acute distress.  CARDIOVASCULAR:  Fingers have good brisk refill and good turgor.   PULMONARY: Respirations are even and non-labored.  NEURO: Awake, alert, and oriented x 3.  PSYCH: Mood & affect are appropriate.  Ortho/SPM Exam  Hand/Wrist Musculoskeletal Exam  Swelling of the right 1st dorsal " compartment  Positive Finkelstein's  Negative thumb CMC grind  No tenderness of the thumb CMC joint  She has pain over the right 1st dorsal compartment  No decreased sensation of the superficial radial nerve  No erythema  No signs of infection  No loss of motion        Provider Note/Medical Decision Makin. De Quervain's tenosynovitis, right  Assessment & Plan:  The patient and I talked at length about the natural history and pathophysiology of right de Quervain, she understands that this is a chronic problem which may have acute episodic exacerbations.   Symptoms may resolve, worsen and even become permanent.  The patient understands the treatment options including observation, activity modification, therapy, NSAIDs, splints, injections and the surgical options including right 1st dorsal compartment release.  We discussed the risks of the diagnosis and the treatment options including pain, infection, bleeding, damage to nerves and vessels, stiffness, scarring, incomplete relief or recurrence of symptoms, poor pain and functional outcomes.  Unique risks of this diagnosis and the treatment include recurrence and tendon subluxation.  The patients treatment is further complicated by underlying CMC arthritis.  The patient has elected to proceed with right 1st compartment injection and bracing and we will follow up in 6 weeks if need be.             I discussed worrisome and red flag signs and symptoms with the patient. The patient expressed understanding and agreed to alert me immediately or to go to the emergency room if they experience any of these.   Treatment plan was developed with input from the patient/family, and they expressed understanding and agreement with the plan. All questions were answered today.    There are no Patient Instructions on file for this visit.    JANINA Madsen M.D.  [facility]  Orthopedic Hand and Wrist Surgeon    [facility]  Dr. Kirby  [facility]   [employer]   [place]     Disclaimer: This note was prepared using a voice recognition system and is likely to have sound alike errors within the text.          [1]   Social History  Socioeconomic History    Marital status:    Tobacco Use    Smoking status: Never    Smokeless tobacco: Never   Substance and Sexual Activity    Alcohol use: Yes     Comment: socially    Drug use: No    Sexual activity: Yes     Partners: Male     Social Drivers of Health     Financial Resource Strain: Low Risk  (6/7/2023)    [facility]    Overall Financial Resource Strain (CARDIA)     Difficulty of Paying Living Expenses: Not hard at all   Food Insecurity: No Food Insecurity (6/7/2023)    Received from [facility]    Hunger Vital Sign     Worried About Running Out of Food in the Last Year: Never true     Ran Out of Food in the Last Year: Never true   Transportation Needs: No Transportation Needs (6/7/2023)    [facility]    PRAPARE - Transportation     Lack of Transportation (Medical): No     Lack of Transportation (Non-Medical): No   Physical Activity: Sufficiently Active (6/7/2023)    [facility]    Exercise Vital Sign     Days of Exercise per Week: 5 days     Minutes of Exercise per Session: 40 min   Stress: No Stress Concern Present (6/7/2023)    Received from [facility]    [employer] [place] [employer] - Occupational Stress Questionnaire     Feeling of Stress : Only a little   Housing Stability: Low Risk  (6/7/2023)    Received from [facility]    Housing Stability Vital Sign     Unable to Pay for Housing in the Last Year: No     Number of Places Lived in the Last Year: 1     Unstable Housing in the Last Year: No

## 2025-03-27 NOTE — ASSESSMENT & PLAN NOTE
The patient and I talked at length about the natural history and pathophysiology of right de Quervain, she understands that this is a chronic problem which may have acute episodic exacerbations.   Symptoms may resolve, worsen and even become permanent.  The patient understands the treatment options including observation, activity modification, therapy, NSAIDs, splints, injections and the surgical options including right 1st dorsal compartment release.  We discussed the risks of the diagnosis and the treatment options including pain, infection, bleeding, damage to nerves and vessels, stiffness, scarring, incomplete relief or recurrence of symptoms, poor pain and functional outcomes.  Unique risks of this diagnosis and the treatment include recurrence and tendon subluxation.  The patients treatment is further complicated by underlying CMC arthritis.  The patient has elected to proceed with right 1st compartment injection and bracing and we will follow up in 6 weeks if need be.

## 2025-04-04 ENCOUNTER — PATIENT MESSAGE (OUTPATIENT)
Dept: ORTHOPEDICS | Facility: CLINIC | Age: 60
End: 2025-04-04
Payer: COMMERCIAL

## 2025-05-05 ENCOUNTER — PATIENT MESSAGE (OUTPATIENT)
Dept: ORTHOPEDICS | Facility: CLINIC | Age: 60
End: 2025-05-05
Payer: COMMERCIAL

## 2025-07-02 ENCOUNTER — OFFICE VISIT (OUTPATIENT)
Dept: ORTHOPEDICS | Facility: CLINIC | Age: 60
End: 2025-07-02
Payer: COMMERCIAL

## 2025-07-02 DIAGNOSIS — G56.01 RIGHT CARPAL TUNNEL SYNDROME: ICD-10-CM

## 2025-07-02 DIAGNOSIS — M65.4 DE QUERVAIN'S TENOSYNOVITIS, RIGHT: Primary | ICD-10-CM

## 2025-07-02 PROCEDURE — 99214 OFFICE O/P EST MOD 30 MIN: CPT | Mod: S$GLB,,, | Performed by: ORTHOPAEDIC SURGERY

## 2025-07-02 PROCEDURE — 99999 PR PBB SHADOW E&M-EST. PATIENT-LVL III: CPT | Mod: PBBFAC,,, | Performed by: ORTHOPAEDIC SURGERY

## 2025-07-02 PROCEDURE — 1159F MED LIST DOCD IN RCRD: CPT | Mod: CPTII,S$GLB,, | Performed by: ORTHOPAEDIC SURGERY

## 2025-07-02 NOTE — H&P (VIEW-ONLY)
JANINA Madsen M.D.  Orthopaedic Hand and Wrist Surgery  [address]    Patient ID: Juju Cruz  YOB: 1965  MRN: [id number]    Provider Note/Medical Decision Makin. De Quervain's tenosynovitis, right  Assessment & Plan:  The patient and I talked at length about the natural history and pathophysiology of right de Quervain, she understands that this is a chronic problem which may have acute episodic exacerbations.   Symptoms may resolve, worsen and even become permanent.  The patient understands the treatment options including observation, activity modification, therapy, NSAIDs, splints, injections and the surgical options including right 1st dorsal compartment release.  We discussed the risks of the diagnosis and the treatment options including pain, infection, bleeding, damage to nerves and vessels, stiffness, scarring, incomplete relief or recurrence of symptoms, poor pain and functional outcomes.  Unique risks of this diagnosis and the treatment include recurrence and tendon subluxation.  The patients treatment is further complicated by underlying CMC arthritis.  The patient has elected to proceed with right 1st compartment  release and we will follow up  postop        2. Right carpal tunnel syndrome  Assessment & Plan:  The patient and I talked at length about the natural history and pathophysiology of  right carpal tunnel syndrome, she understands that this is a chronic problem which may have acute episodic exacerbations.   Symptoms may resolve, worsen and even become permanent.  The patient understands the treatment options including observation, activity modification, therapy, NSAIDs, splints, injections and the surgical options including  carpal tunnel release.  The risks of the diagnosis and the treatment options include pain, infection, bleeding, damage to nerves and vessels, stiffness, scarring, incomplete relief or recurrence of symptoms, poor pain and  "functional outcomes.  Unique risks of this diagnosis and the treatment include  incomplete relief of symptoms, recurrence, permanent nerve damage.    The patient has elected to proceed with  right carpal tunnel release and we will follow up  postop.             Chief Complaint:  right radial sided wrist pain and right-hand numbness    Referred By: * No referring provider recorded for this case *    History of Present Illness: Juju Cruz is a 60 y.o. female presents for follow up of right wrist pain. She reports her last injection in March lasted a couple of weeks. She denies any history of injury. She rates her pain 3/10.   She also notes that her hand is going numb with certain activities for approximately 1 month.  She denies any pain of her left hand. Her hand goes numb at night also when driving and writing.  Mostly over the radial 3 and half digits no small finger numbness    Recall HPI from 3/27/25:   Juju Cruz is a 60 y.o. female here today for evaluation of right radial sided wrist pain she denies any history of injury it has been going on for about 3 months now it is difficult for her to lift it bothers her at night she has no some swelling over the radial side of the right wrist as well her thumb CMC arthritis is doing well.        Patient was queried and this is the extent of the patients current complaints today.    Past Medical History:     Estimated body mass index is 25.81 kg/m² as calculated from the following:    Height as of 3/27/25: 5' 2" (1.575 m).    Weight as of 3/27/25: 64 kg (141 lb 1.5 oz).  Past Medical History:   Diagnosis Date    Bipolar disorder, unspecified     Depression     Hypertension     Menopause     Ovarian cyst      Past Surgical History:   Procedure Laterality Date    AUGMENTATION OF BREAST  2001    Saline    breast implants      BREAST SURGERY Bilateral     COLON SURGERY  2014    HYSTEROSCOPY  08/10/2021    myosure polypectomy    SINUS SURGERY      SMALL INTESTINE " SURGERY      diverticulitis     Family History   Problem Relation Name Age of Onset    Hypertension Paternal Grandfather      Prostate cancer Paternal Grandfather      Ovarian cancer Paternal Grandmother      Breast cancer Paternal Grandmother      Colon cancer Paternal Grandmother      Asthma Maternal Grandmother      Prostate cancer Father      Colon cancer Father      Hypertension Mother      Hyperlipidemia Mother       Social History[1]  Medication List with Changes/Refills   Current Medications    AMLODIPINE (NORVASC) 2.5 MG TABLET    TK 1 T PO D    CLOBETASOL 0.05% (TEMOVATE) 0.05 % OINT    Apply externally twice daily for 2 weeks then daily for 1 week then every other day for 1 week.    GABAPENTIN (NEURONTIN) 300 MG CAPSULE    TK 1 C PO ATN    ROPINIROLE (REQUIP) 0.25 MG TABLET    Take 0.25 mg by mouth.    ROSUVASTATIN (CRESTOR) 10 MG TABLET    Take 1 tablet by mouth every evening.     Review of patient's allergies indicates:   Allergen Reactions    Hydrocodone-acetaminophen Shortness Of Breath and Anaphylaxis     Other reaction(s): drug allergy, Unknown     ROS    Physical Exam:   GENERAL: Well appearing, appropriate for stated age, no acute distress.  CARDIOVASCULAR:  Fingers have good brisk refill and good turgor.   PULMONARY: Respirations are even and non-labored.  NEURO: Awake, alert, and oriented x 3.  PSYCH: Mood & affect are appropriate.  Ortho/SPM Exam  Hand/Wrist Musculoskeletal Exam   Full range of motion of the right hand   Positive Finkelstein's   Tenderness of the 1st dorsal compartment   Negative thumb CMC grind   Positive Tinel's over the carpal tunnel   Negative  elbow flexion test   Positive Phalen's and  Median nerve compression test   5/5 abductor pollicis brevis and 1st dorsal interosseous      Provider Note/Medical Decision Makin. De Quervain's tenosynovitis, right  Assessment & Plan:  The patient and I talked at length about the natural history and pathophysiology of right de  Venu, she understands that this is a chronic problem which may have acute episodic exacerbations.   Symptoms may resolve, worsen and even become permanent.  The patient understands the treatment options including observation, activity modification, therapy, NSAIDs, splints, injections and the surgical options including right 1st dorsal compartment release.  We discussed the risks of the diagnosis and the treatment options including pain, infection, bleeding, damage to nerves and vessels, stiffness, scarring, incomplete relief or recurrence of symptoms, poor pain and functional outcomes.  Unique risks of this diagnosis and the treatment include recurrence and tendon subluxation.  The patients treatment is further complicated by underlying CMC arthritis.  The patient has elected to proceed with right 1st compartment  release and we will follow up  postop        2. Right carpal tunnel syndrome  Assessment & Plan:  The patient and I talked at length about the natural history and pathophysiology of  right carpal tunnel syndrome, she understands that this is a chronic problem which may have acute episodic exacerbations.   Symptoms may resolve, worsen and even become permanent.  The patient understands the treatment options including observation, activity modification, therapy, NSAIDs, splints, injections and the surgical options including  carpal tunnel release.  The risks of the diagnosis and the treatment options include pain, infection, bleeding, damage to nerves and vessels, stiffness, scarring, incomplete relief or recurrence of symptoms, poor pain and functional outcomes.  Unique risks of this diagnosis and the treatment include  incomplete relief of symptoms, recurrence, permanent nerve damage.    The patient has elected to proceed with  right carpal tunnel release and we will follow up  postop.             I discussed worrisome and red flag signs and symptoms with the patient. The patient expressed  understanding and agreed to alert me immediately or to go to the emergency room if they experience any of these.   Treatment plan was developed with input from the patient/family, and they expressed understanding and agreement with the plan. All questions were answered today.    There are no Patient Instructions on file for this visit.    JANINA Madsen M.D.  [facility]  Orthopedic Hand and Wrist Surgeon    [facility]  Dr. Kofi Madsen   [employer]   [employer]     Disclaimer: This note was prepared using a voice recognition system and is likely to have sound alike errors within the text.          [1]   Social History  Socioeconomic History    Marital status:    Tobacco Use    Smoking status: Never    Smokeless tobacco: Never   Substance and Sexual Activity    Alcohol use: Yes     Comment: socially    Drug use: No    Sexual activity: Yes     Partners: Male     Social Drivers of Health     Financial Resource Strain: Low Risk  (6/7/2023)    [facility]    Overall Financial Resource Strain (CARDIA)     Difficulty of Paying Living Expenses: Not hard at all   Food Insecurity: No Food Insecurity (6/7/2023)    [facility]    Hunger Vital Sign     Worried About Running Out of Food in the Last Year: Never true     Ran Out of Food in the Last Year: Never true   Transportation Needs: No Transportation Needs (6/7/2023)    [facility]    PRAPARE - Transportation     Lack of Transportation (Medical): No     Lack of Transportation (Non-Medical): No   Physical Activity: Sufficiently Active (6/7/2023)    [facility]     Exercise Vital Sign     Days of Exercise per Week: 5 days     Minutes of Exercise per Session: 40 min   Stress: No Stress Concern Present (6/7/2023)    Received from [facility] Its [facility]sidi[facility] and Affiliates    [place] [facility] [employer] - Occupational Stress Questionnaire     Feeling of Stress : Only a little   Housing Stability: Low Risk  (6/7/2023)    Received from [facility] Its Subsidiaries and Affiliates    Housing Stability Vital Sign     Unable to Pay for Housing in the Last Year: No     Number of Places Lived in the Last Year: 1     Unstable Housing in the Last Year: No

## 2025-07-02 NOTE — ASSESSMENT & PLAN NOTE
The patient and I talked at length about the natural history and pathophysiology of right de Quervain, she understands that this is a chronic problem which may have acute episodic exacerbations.   Symptoms may resolve, worsen and even become permanent.  The patient understands the treatment options including observation, activity modification, therapy, NSAIDs, splints, injections and the surgical options including right 1st dorsal compartment release.  We discussed the risks of the diagnosis and the treatment options including pain, infection, bleeding, damage to nerves and vessels, stiffness, scarring, incomplete relief or recurrence of symptoms, poor pain and functional outcomes.  Unique risks of this diagnosis and the treatment include recurrence and tendon subluxation.  The patients treatment is further complicated by underlying CMC arthritis.  The patient has elected to proceed with right 1st compartment  release and we will follow up  postop

## 2025-07-02 NOTE — PROGRESS NOTES
JANINA Madsen M.D.  Orthopaedic Hand and Wrist Surgery  [address]    Patient ID: Juju Cruz  YOB: 1965  MRN: [id number]    Provider Note/Medical Decision Makin. De Quervain's tenosynovitis, right  Assessment & Plan:  The patient and I talked at length about the natural history and pathophysiology of right de Quervain, she understands that this is a chronic problem which may have acute episodic exacerbations.   Symptoms may resolve, worsen and even become permanent.  The patient understands the treatment options including observation, activity modification, therapy, NSAIDs, splints, injections and the surgical options including right 1st dorsal compartment release.  We discussed the risks of the diagnosis and the treatment options including pain, infection, bleeding, damage to nerves and vessels, stiffness, scarring, incomplete relief or recurrence of symptoms, poor pain and functional outcomes.  Unique risks of this diagnosis and the treatment include recurrence and tendon subluxation.  The patients treatment is further complicated by underlying CMC arthritis.  The patient has elected to proceed with right 1st compartment  release and we will follow up  postop        2. Right carpal tunnel syndrome  Assessment & Plan:  The patient and I talked at length about the natural history and pathophysiology of  right carpal tunnel syndrome, she understands that this is a chronic problem which may have acute episodic exacerbations.   Symptoms may resolve, worsen and even become permanent.  The patient understands the treatment options including observation, activity modification, therapy, NSAIDs, splints, injections and the surgical options including  carpal tunnel release.  The risks of the diagnosis and the treatment options include pain, infection, bleeding, damage to nerves and vessels, stiffness, scarring, incomplete relief or recurrence of symptoms, poor pain and  "functional outcomes.  Unique risks of this diagnosis and the treatment include  incomplete relief of symptoms, recurrence, permanent nerve damage.    The patient has elected to proceed with  right carpal tunnel release and we will follow up  postop.             Chief Complaint:  right radial sided wrist pain and right-hand numbness    Referred By: * No referring provider recorded for this case *    History of Present Illness: Juju Cruz is a 60 y.o. female presents for follow up of right wrist pain. She reports her last injection in March lasted a couple of weeks. She denies any history of injury. She rates her pain 3/10.   She also notes that her hand is going numb with certain activities for approximately 1 month.  She denies any pain of her left hand. Her hand goes numb at night also when driving and writing.  Mostly over the radial 3 and half digits no small finger numbness    Recall HPI from 3/27/25:   Juju Cruz is a 60 y.o. female here today for evaluation of right radial sided wrist pain she denies any history of injury it has been going on for about 3 months now it is difficult for her to lift it bothers her at night she has no some swelling over the radial side of the right wrist as well her thumb CMC arthritis is doing well.        Patient was queried and this is the extent of the patients current complaints today.    Past Medical History:     Estimated body mass index is 25.81 kg/m² as calculated from the following:    Height as of 3/27/25: 5' 2" (1.575 m).    Weight as of 3/27/25: 64 kg (141 lb 1.5 oz).  Past Medical History:   Diagnosis Date    Bipolar disorder, unspecified     Depression     Hypertension     Menopause     Ovarian cyst      Past Surgical History:   Procedure Laterality Date    AUGMENTATION OF BREAST  2001    Saline    breast implants      BREAST SURGERY Bilateral     COLON SURGERY  2014    HYSTEROSCOPY  08/10/2021    myosure polypectomy    SINUS SURGERY      SMALL INTESTINE " SURGERY      diverticulitis     Family History   Problem Relation Name Age of Onset    Hypertension Paternal Grandfather      Prostate cancer Paternal Grandfather      Ovarian cancer Paternal Grandmother      Breast cancer Paternal Grandmother      Colon cancer Paternal Grandmother      Asthma Maternal Grandmother      Prostate cancer Father      Colon cancer Father      Hypertension Mother      Hyperlipidemia Mother       Social History[1]  Medication List with Changes/Refills   Current Medications    AMLODIPINE (NORVASC) 2.5 MG TABLET    TK 1 T PO D    CLOBETASOL 0.05% (TEMOVATE) 0.05 % OINT    Apply externally twice daily for 2 weeks then daily for 1 week then every other day for 1 week.    GABAPENTIN (NEURONTIN) 300 MG CAPSULE    TK 1 C PO ATN    ROPINIROLE (REQUIP) 0.25 MG TABLET    Take 0.25 mg by mouth.    ROSUVASTATIN (CRESTOR) 10 MG TABLET    Take 1 tablet by mouth every evening.     Review of patient's allergies indicates:   Allergen Reactions    Hydrocodone-acetaminophen Shortness Of Breath and Anaphylaxis     Other reaction(s): drug allergy, Unknown     ROS    Physical Exam:   GENERAL: Well appearing, appropriate for stated age, no acute distress.  CARDIOVASCULAR:  Fingers have good brisk refill and good turgor.   PULMONARY: Respirations are even and non-labored.  NEURO: Awake, alert, and oriented x 3.  PSYCH: Mood & affect are appropriate.  Ortho/SPM Exam  Hand/Wrist Musculoskeletal Exam   Full range of motion of the right hand   Positive Finkelstein's   Tenderness of the 1st dorsal compartment   Negative thumb CMC grind   Positive Tinel's over the carpal tunnel   Negative  elbow flexion test   Positive Phalen's and  Median nerve compression test   5/5 abductor pollicis brevis and 1st dorsal interosseous      Provider Note/Medical Decision Makin. De Quervain's tenosynovitis, right  Assessment & Plan:  The patient and I talked at length about the natural history and pathophysiology of right de  Venu, she understands that this is a chronic problem which may have acute episodic exacerbations.   Symptoms may resolve, worsen and even become permanent.  The patient understands the treatment options including observation, activity modification, therapy, NSAIDs, splints, injections and the surgical options including right 1st dorsal compartment release.  We discussed the risks of the diagnosis and the treatment options including pain, infection, bleeding, damage to nerves and vessels, stiffness, scarring, incomplete relief or recurrence of symptoms, poor pain and functional outcomes.  Unique risks of this diagnosis and the treatment include recurrence and tendon subluxation.  The patients treatment is further complicated by underlying CMC arthritis.  The patient has elected to proceed with right 1st compartment  release and we will follow up  postop        2. Right carpal tunnel syndrome  Assessment & Plan:  The patient and I talked at length about the natural history and pathophysiology of  right carpal tunnel syndrome, she understands that this is a chronic problem which may have acute episodic exacerbations.   Symptoms may resolve, worsen and even become permanent.  The patient understands the treatment options including observation, activity modification, therapy, NSAIDs, splints, injections and the surgical options including  carpal tunnel release.  The risks of the diagnosis and the treatment options include pain, infection, bleeding, damage to nerves and vessels, stiffness, scarring, incomplete relief or recurrence of symptoms, poor pain and functional outcomes.  Unique risks of this diagnosis and the treatment include  incomplete relief of symptoms, recurrence, permanent nerve damage.    The patient has elected to proceed with  right carpal tunnel release and we will follow up  postop.             I discussed worrisome and red flag signs and symptoms with the patient. The patient expressed  understanding and agreed to alert me immediately or to go to the emergency room if they experience any of these.   Treatment plan was developed with input from the patient/family, and they expressed understanding and agreement with the plan. All questions were answered today.    There are no Patient Instructions on file for this visit.    JANINA Madsen M.D.  [facility]  Orthopedic Hand and Wrist Surgeon    [facility] Specialist  Dr. Kofi Madsen   [employer]   [employer]     Disclaimer: This note was prepared using a voice recognition system and is likely to have sound alike errors within the text.          [1]   Social History  Socioeconomic History    Marital status:    Tobacco Use    Smoking status: Never    Smokeless tobacco: Never   Substance and Sexual Activity    Alcohol use: Yes     Comment: socially    Drug use: No    Sexual activity: Yes     Partners: Male     Social Drivers of Health     Financial Resource Strain: Low Risk  (6/7/2023)    [facility]    Overall Financial Resource Strain (CARDIA)     Difficulty of Paying Living Expenses: Not hard at all   Food Insecurity: No Food Insecurity (6/7/2023)    [facility]    Hunger Vital Sign     Worried About Running Out of Food in the Last Year: Never true     Ran Out of Food in the Last Year: Never true   Transportation Needs: No Transportation Needs (6/7/2023)    [facility]    PRAPARE - Transportation     Lack of Transportation (Medical): No     Lack of Transportation (Non-Medical): No   Physical Activity: Sufficiently Active (6/7/2023)    [facility]     Exercise Vital Sign     Days of Exercise per Week: 5 days     Minutes of Exercise per Session: 40 min   Stress: No Stress Concern Present (6/7/2023)    [facility] from [facility] and Its Subsidiaries and Affiliates    [place] [employer] - Occupational Stress Questionnaire     Feeling of Stress : Only a little   Housing Stability: Low Risk  (6/7/2023)    Received from [facility] Its Subsidi[facility] and Affiliates    Housing Stability Vital Sign     Unable to Pay for Housing in the Last Year: No     Number of Places Lived in the Last Year: 1     Unstable Housing in the Last Year: No

## 2025-07-02 NOTE — ASSESSMENT & PLAN NOTE
The patient and I talked at length about the natural history and pathophysiology of  right carpal tunnel syndrome, she understands that this is a chronic problem which may have acute episodic exacerbations.   Symptoms may resolve, worsen and even become permanent.  The patient understands the treatment options including observation, activity modification, therapy, NSAIDs, splints, injections and the surgical options including  carpal tunnel release.  The risks of the diagnosis and the treatment options include pain, infection, bleeding, damage to nerves and vessels, stiffness, scarring, incomplete relief or recurrence of symptoms, poor pain and functional outcomes.  Unique risks of this diagnosis and the treatment include  incomplete relief of symptoms, recurrence, permanent nerve damage.    The patient has elected to proceed with  right carpal tunnel release and we will follow up  postop.

## 2025-07-03 DIAGNOSIS — Z01.818 PRE-OP TESTING: Primary | ICD-10-CM

## 2025-07-08 ENCOUNTER — LAB VISIT (OUTPATIENT)
Dept: LAB | Facility: HOSPITAL | Age: 60
End: 2025-07-08
Attending: ORTHOPAEDIC SURGERY
Payer: COMMERCIAL

## 2025-07-08 DIAGNOSIS — Z01.818 PRE-OP TESTING: ICD-10-CM

## 2025-07-08 LAB
ALBUMIN SERPL BCP-MCNC: 4.2 G/DL (ref 3.5–5.2)
ALP SERPL-CCNC: 75 UNIT/L (ref 40–150)
ALT SERPL W/O P-5'-P-CCNC: 32 UNIT/L (ref 10–44)
ANION GAP (OHS): 11 MMOL/L (ref 8–16)
AST SERPL-CCNC: 32 UNIT/L (ref 11–45)
BILIRUB SERPL-MCNC: 0.6 MG/DL (ref 0.1–1)
BUN SERPL-MCNC: 21 MG/DL (ref 6–20)
CALCIUM SERPL-MCNC: 9.3 MG/DL (ref 8.7–10.5)
CHLORIDE SERPL-SCNC: 107 MMOL/L (ref 95–110)
CO2 SERPL-SCNC: 25 MMOL/L (ref 23–29)
CREAT SERPL-MCNC: 0.7 MG/DL (ref 0.5–1.4)
ERYTHROCYTE [DISTWIDTH] IN BLOOD BY AUTOMATED COUNT: 12.6 % (ref 11.5–14.5)
GFR SERPLBLD CREATININE-BSD FMLA CKD-EPI: >60 ML/MIN/1.73/M2
GLUCOSE SERPL-MCNC: 82 MG/DL (ref 70–110)
HCT VFR BLD AUTO: 40.2 % (ref 37–48.5)
HGB BLD-MCNC: 13.5 GM/DL (ref 12–16)
MCH RBC QN AUTO: 30.5 PG (ref 27–31)
MCHC RBC AUTO-ENTMCNC: 33.6 G/DL (ref 32–36)
MCV RBC AUTO: 91 FL (ref 82–98)
PLATELET # BLD AUTO: 241 K/UL (ref 150–450)
PMV BLD AUTO: 11.7 FL (ref 9.2–12.9)
POTASSIUM SERPL-SCNC: 4 MMOL/L (ref 3.5–5.1)
PROT SERPL-MCNC: 7.3 GM/DL (ref 6–8.4)
RBC # BLD AUTO: 4.43 M/UL (ref 4–5.4)
SODIUM SERPL-SCNC: 143 MMOL/L (ref 136–145)
WBC # BLD AUTO: 5.93 K/UL (ref 3.9–12.7)

## 2025-07-08 PROCEDURE — 80053 COMPREHEN METABOLIC PANEL: CPT

## 2025-07-08 PROCEDURE — 36415 COLL VENOUS BLD VENIPUNCTURE: CPT | Mod: PO

## 2025-07-08 PROCEDURE — 85027 COMPLETE CBC AUTOMATED: CPT

## 2025-07-16 ENCOUNTER — ANESTHESIA EVENT (OUTPATIENT)
Dept: SURGERY | Facility: HOSPITAL | Age: 60
End: 2025-07-16
Payer: COMMERCIAL

## 2025-07-16 ENCOUNTER — TELEPHONE (OUTPATIENT)
Dept: PREADMISSION TESTING | Facility: HOSPITAL | Age: 60
End: 2025-07-16
Payer: COMMERCIAL

## 2025-07-16 NOTE — TELEPHONE ENCOUNTER
Pre op instructions reviewed with Pt per phone.      To confirm, your doctor has instructed you: Surgery is scheduled for 7/18/25.   Pre admit office will call the afternoon prior to surgery between 1PM and 3PM with arrival time.    Surgery will be at [facility],  The address is [address]. [place] [address].      IMPORTANT INSTRUCTIONS!    Do not eat or drink after 12 midnight, including water. Do not smoke or use chewing tobacco after 12 midnight!  OK to brush teeth, but no gum, candy, or mints!      *Take only these medicines with a small swallow of water-morning of surgery*     NONE         ____ Stop taking all vitamins, herbal supplements, Aspirin, & NSAIDS (Ibuprofen, Advil, Aleve) 7 days prior to surgery, as these can thin your blood.    ____ Weight loss medication, such as Adipex and Phentermine, must be stopped 14 days prior to surgery, no exceptions!    *Diabetic Patients: If you take diabetic or weight loss medication, do NOT take morning of surgery unless instructed by Doctor. Metformin to be stopped 24 hrs prior to surgery. DO NOT take short-acting insulin the day of surgery. Only take HALF of your regular dose of long-acting insulin the night before surgery, unless instructed otherwise. Blood sugars will be checked in pre-op.   ~Ozempic/Mounjaro/Wegovy/Trulicity/Semaglutide injections must be stopped 7 days prior to surgery.     Please notify MD office if you develop an active infection, are prescribed antibiotics by someone other than the surgeon doing your surgery, or visit urgent care/ER.    Bathing Instructions:   The night before surgery and the morning prior to coming to the hospital:    - Shower & rinse your body as usual with anti-bacterial Soap (Dial or Lever 2000)   -Hibiclens (if indicated) use AFTER anti-bacterial soap; 1 packet PM/1 packet in AM on surgical site only   -Do not use hibiclens on your head, face, or genitals.    -Do not wash with anti-bacterial soap  after you use the hibiclens.    -Do not shave surgical site 5-7 days prior to surgery.    -Pubic hair 7 days prior to surgery (OBGYN/Urology only).       Additional Instructions:   __ No makeup, powder, lotions, creams, or body spray to skin   __ No deodorant if you are having: breast procedure, PORT insertion, or shoulder surgery!   __ No nail polish or artificial nails due to risk of infection.             **SURGERY MAY BE CANCELLED AT SURGEON'S DISCRETION IF ARTIFICIAL/NAIL POLISH IS PRESENT!!!**  __ Please remove all piercings and leave all jewelry at home.    **SURGERY WILL BE CANCELLED IF PIERCINGS ARE PRESENT!!!**    __ Dentures, Hearing Aids and Contact Lens need to be removed prior to the start of surgery.    __ Avoid Alcoholic beverages 3 days prior to surgery, as it can thin the blood, unless told otherwise by pre-admit department.  __ Females: may need to give a urine sample the morning of surgery;   **Please ask  for a specimen cup if you need to use the restroom prior to being called into pre-op.**  __ Males: Stop ED medications (Viagra, Cialis) 24 hrs prior to surgery.  __ You must have transportation, and they MUST stay the entire time.   __  Bring photo ID and insurance information to hospital    What to Wear:  Clean, loose-fitting clothing. Please allow for dressings/bandages/surgical equipment/drains.   ~Breast Patients: We recommend a button up shirt so you do not have to lift your arms.   Amazon Link recommended by breast surgeons if you will have drains in place: https://a.co/d/tTAJ0tY  ~Shoulder Patients: We recommend a button up shirt. If unavailable, an oversized t-shirt (2 sizes bigger than your normal size) or a stretchy dress will also work.   Amazon Link for hospital type button sleeve t-shirt: https://a.co/d/86BAbRk  ~Knee Patients: We recommend oversized pants/shorts or a dress to accommodate any knee braces in place. Braces will not be removed to get dressed.    [facility]  Visitor/Ride Policy:    Only 1 adult allowed (18 or older) to accompany you and MUST STAY through the entire admission length.      -Must have a ride home from a responsible adult that you know and trust.     -Medical Transport, Uber or Lyft can only be used if patient has a responsible adult to   accompany them during ride home.      ~Your ride MUST STAY the entire time until you are discharged~   Please notify the pre-admit department prior to surgery if you use medication transportation so we can verify your arrival/pickup time!   -The patient is responsible for setting up their own transportation!    Discharge Prescriptions:  Your discharge prescriptions will be sent next door to [facility], unless otherwise discussed with your surgeon. Your  will be responsible for calling the pharmacy ([phone]) to begin the prescription filling process. They will receive a text message with instructions once you are in recovery. Please make sure we have your insurance information and you bring a payment method (cash or card) if needed for prescriptions. If you have  insurance, please let the pre-op nurse know, as the pharmacy does not take this insurance.     *If you are running late or have questions the morning of surgery, please call the Pre-OP Department @ [phone].       *Please Call [facility] Pre-Admit Department for surgery instruction questions: (M-F 8AM-4:30PM)  [phone]     Financial Questions:  [place]: [phone]  Hours ~ 5AM-1:30PM  Monday-Friday    Billing Question Numbers:   [phone]

## 2025-07-16 NOTE — ANESTHESIA PREPROCEDURE EVALUATION
07/16/2025  Juju Cruz is a 60 y.o., female.  Past Medical History:   Diagnosis Date    Bipolar disorder, unspecified     Depression     Hypertension     Menopause     Ovarian cyst      Past Surgical History:   Procedure Laterality Date    AUGMENTATION OF BREAST  2001    [place]    breast implants      BREAST SURGERY Bilateral     COLON SURGERY  2014    HYSTEROSCOPY  08/10/2021    myosure polypectomy    SINUS SURGERY      SMALL INTESTINE SURGERY      diverticulitis         Pre-op Assessment    I have reviewed the Patient Summary Reports.     I have reviewed the Nursing Notes. I have reviewed the NPO Status.   I have reviewed the Medications.     Review of Systems  Anesthesia Hx:  No problems with previous Anesthesia   History of prior surgery of interest to airway management or planning:  Previous anesthesia: General        Denies Family Hx of Anesthesia complications.    Denies Personal Hx of Anesthesia complications.                    Social:  Non-Smoker       Hematology/Oncology:  Hematology Normal                                     Cardiovascular:     Hypertension                                          Pulmonary:  Pulmonary Normal                       Renal/:  Renal/ Normal                 Hepatic/GI:  Hepatic/GI Normal                    Musculoskeletal:  Arthritis               Neurological:    Neuromuscular Disease,                                   Psych:  Psychiatric History  depression                Physical Exam  General: Alert and Oriented    Airway:  Mallampati: II   Mouth Opening: Normal  TM Distance: Normal  Tongue: Normal  Neck ROM: Normal ROM    Dental:  Intact    Chest/Lungs:  Clear to auscultation, Normal Respiratory Rate    Heart:  Rate: Normal  Rhythm: Regular Rhythm        Anesthesia Plan  Type of Anesthesia, risks & benefits discussed:    Anesthesia Type: Gen Supraglottic  Airway, Gen Natural Airway  Intra-op Monitoring Plan: Standard ASA Monitors  Post Op Pain Control Plan: multimodal analgesia and IV/PO Opioids PRN  Induction:  IV  Informed Consent: Informed consent signed with the Patient and all parties understand the risks and agree with anesthesia plan.  All questions answered.   ASA Score: 2  Day of Surgery Review of History & Physical: H&P Update referred to the surgeon/provider.    Ready For Surgery From Anesthesia Perspective.     .

## 2025-07-17 ENCOUNTER — PATIENT MESSAGE (OUTPATIENT)
Dept: PREADMISSION TESTING | Facility: HOSPITAL | Age: 60
End: 2025-07-17
Payer: COMMERCIAL

## 2025-07-18 ENCOUNTER — ANESTHESIA (OUTPATIENT)
Dept: SURGERY | Facility: HOSPITAL | Age: 60
End: 2025-07-18
Payer: COMMERCIAL

## 2025-07-18 ENCOUNTER — HOSPITAL ENCOUNTER (OUTPATIENT)
Facility: HOSPITAL | Age: 60
Discharge: HOME | End: 2025-07-18
Attending: ORTHOPAEDIC SURGERY | Admitting: ORTHOPAEDIC SURGERY
Payer: COMMERCIAL

## 2025-07-18 VITALS
SYSTOLIC BLOOD PRESSURE: 107 MMHG | TEMPERATURE: 98 F | DIASTOLIC BLOOD PRESSURE: 65 MMHG | HEIGHT: 62 IN | OXYGEN SATURATION: 97 % | RESPIRATION RATE: 19 BRPM | HEART RATE: 85 BPM | WEIGHT: 140.13 LBS | BODY MASS INDEX: 25.79 KG/M2

## 2025-07-18 DIAGNOSIS — M65.4 DE QUERVAIN'S TENOSYNOVITIS, RIGHT: Primary | ICD-10-CM

## 2025-07-18 PROCEDURE — 37000008 HC ANESTHESIA 1ST 15 MINUTES: Performed by: ORTHOPAEDIC SURGERY

## 2025-07-18 PROCEDURE — 36000707: Performed by: ORTHOPAEDIC SURGERY

## 2025-07-18 PROCEDURE — 63600175 PHARM REV CODE 636 W HCPCS: Performed by: ORTHOPAEDIC SURGERY

## 2025-07-18 PROCEDURE — 64721 CARPAL TUNNEL SURGERY: CPT | Mod: RT,,, | Performed by: ORTHOPAEDIC SURGERY

## 2025-07-18 PROCEDURE — 71000033 HC RECOVERY, INTIAL HOUR: Performed by: ORTHOPAEDIC SURGERY

## 2025-07-18 PROCEDURE — 37000009 HC ANESTHESIA EA ADD 15 MINS: Performed by: ORTHOPAEDIC SURGERY

## 2025-07-18 PROCEDURE — 63600175 PHARM REV CODE 636 W HCPCS: Performed by: NURSE ANESTHETIST, CERTIFIED REGISTERED

## 2025-07-18 PROCEDURE — 25000 INCISION OF TENDON SHEATH: CPT | Mod: 51,RT,, | Performed by: ORTHOPAEDIC SURGERY

## 2025-07-18 PROCEDURE — 71000015 HC POSTOP RECOV 1ST HR: Performed by: ORTHOPAEDIC SURGERY

## 2025-07-18 PROCEDURE — 36000706: Performed by: ORTHOPAEDIC SURGERY

## 2025-07-18 PROCEDURE — 63600175 PHARM REV CODE 636 W HCPCS: Performed by: ANESTHESIOLOGY

## 2025-07-18 RX ORDER — ONDANSETRON HYDROCHLORIDE 2 MG/ML
4 INJECTION, SOLUTION INTRAVENOUS ONCE AS NEEDED
Status: DISCONTINUED | OUTPATIENT
Start: 2025-07-18 | End: 2025-07-18 | Stop reason: HOSPADM

## 2025-07-18 RX ORDER — MIDAZOLAM HYDROCHLORIDE 1 MG/ML
INJECTION INTRAMUSCULAR; INTRAVENOUS
Status: DISCONTINUED | OUTPATIENT
Start: 2025-07-18 | End: 2025-07-18

## 2025-07-18 RX ORDER — BUPIVACAINE HYDROCHLORIDE 2.5 MG/ML
INJECTION, SOLUTION EPIDURAL; INFILTRATION; INTRACAUDAL; PERINEURAL
Status: DISCONTINUED | OUTPATIENT
Start: 2025-07-18 | End: 2025-07-18 | Stop reason: HOSPADM

## 2025-07-18 RX ORDER — HYDROCODONE BITARTRATE AND ACETAMINOPHEN 5; 325 MG/1; MG/1
1 TABLET ORAL EVERY 6 HOURS PRN
Qty: 5 TABLET | Refills: 0 | Status: SHIPPED | OUTPATIENT
Start: 2025-07-18 | End: 2025-07-25

## 2025-07-18 RX ORDER — FENTANYL CITRATE 50 UG/ML
INJECTION, SOLUTION INTRAMUSCULAR; INTRAVENOUS
Status: DISCONTINUED | OUTPATIENT
Start: 2025-07-18 | End: 2025-07-18

## 2025-07-18 RX ORDER — ONDANSETRON HYDROCHLORIDE 2 MG/ML
INJECTION, SOLUTION INTRAVENOUS
Status: DISCONTINUED | OUTPATIENT
Start: 2025-07-18 | End: 2025-07-18

## 2025-07-18 RX ORDER — LIDOCAINE HYDROCHLORIDE 10 MG/ML
INJECTION, SOLUTION EPIDURAL; INFILTRATION; INTRACAUDAL; PERINEURAL
Status: DISCONTINUED | OUTPATIENT
Start: 2025-07-18 | End: 2025-07-18 | Stop reason: HOSPADM

## 2025-07-18 RX ORDER — HYDROCODONE BITARTRATE AND ACETAMINOPHEN 5; 325 MG/1; MG/1
1 TABLET ORAL EVERY 4 HOURS PRN
Status: DISCONTINUED | OUTPATIENT
Start: 2025-07-18 | End: 2025-07-18

## 2025-07-18 RX ORDER — BUPIVACAINE HYDROCHLORIDE 2.5 MG/ML
INJECTION, SOLUTION EPIDURAL; INFILTRATION; INTRACAUDAL; PERINEURAL
Status: DISCONTINUED
Start: 2025-07-18 | End: 2025-07-18 | Stop reason: HOSPADM

## 2025-07-18 RX ORDER — ALBUTEROL SULFATE 0.83 MG/ML
2.5 SOLUTION RESPIRATORY (INHALATION) EVERY 4 HOURS PRN
Status: DISCONTINUED | OUTPATIENT
Start: 2025-07-18 | End: 2025-07-18 | Stop reason: HOSPADM

## 2025-07-18 RX ORDER — CEFAZOLIN SODIUM 1 G/3ML
INJECTION, POWDER, FOR SOLUTION INTRAMUSCULAR; INTRAVENOUS
Status: DISCONTINUED | OUTPATIENT
Start: 2025-07-18 | End: 2025-07-18

## 2025-07-18 RX ORDER — LIDOCAINE HYDROCHLORIDE 20 MG/ML
INJECTION INTRAVENOUS
Status: DISCONTINUED | OUTPATIENT
Start: 2025-07-18 | End: 2025-07-18

## 2025-07-18 RX ORDER — DIPHENHYDRAMINE HYDROCHLORIDE 50 MG/ML
25 INJECTION, SOLUTION INTRAMUSCULAR; INTRAVENOUS EVERY 6 HOURS PRN
Status: DISCONTINUED | OUTPATIENT
Start: 2025-07-18 | End: 2025-07-18 | Stop reason: HOSPADM

## 2025-07-18 RX ORDER — SODIUM CHLORIDE, SODIUM LACTATE, POTASSIUM CHLORIDE, CALCIUM CHLORIDE 600; 310; 30; 20 MG/100ML; MG/100ML; MG/100ML; MG/100ML
INJECTION, SOLUTION INTRAVENOUS CONTINUOUS
Status: DISCONTINUED | OUTPATIENT
Start: 2025-07-18 | End: 2025-07-18 | Stop reason: HOSPADM

## 2025-07-18 RX ORDER — PROPOFOL 10 MG/ML
INJECTION, EMULSION INTRAVENOUS
Status: DISCONTINUED | OUTPATIENT
Start: 2025-07-18 | End: 2025-07-18

## 2025-07-18 RX ORDER — FENTANYL CITRATE 50 UG/ML
25 INJECTION, SOLUTION INTRAMUSCULAR; INTRAVENOUS EVERY 5 MIN PRN
Status: DISCONTINUED | OUTPATIENT
Start: 2025-07-18 | End: 2025-07-18 | Stop reason: HOSPADM

## 2025-07-18 RX ORDER — LIDOCAINE HYDROCHLORIDE 10 MG/ML
INJECTION, SOLUTION EPIDURAL; INFILTRATION; INTRACAUDAL; PERINEURAL
Status: DISCONTINUED
Start: 2025-07-18 | End: 2025-07-18 | Stop reason: HOSPADM

## 2025-07-18 RX ORDER — DEXAMETHASONE SODIUM PHOSPHATE 4 MG/ML
INJECTION, SOLUTION INTRA-ARTICULAR; INTRALESIONAL; INTRAMUSCULAR; INTRAVENOUS; SOFT TISSUE
Status: DISCONTINUED | OUTPATIENT
Start: 2025-07-18 | End: 2025-07-18

## 2025-07-18 RX ORDER — CHLORHEXIDINE GLUCONATE ORAL RINSE 1.2 MG/ML
10 SOLUTION DENTAL 2 TIMES DAILY
Status: DISCONTINUED | OUTPATIENT
Start: 2025-07-18 | End: 2025-07-18 | Stop reason: HOSPADM

## 2025-07-18 RX ADMIN — SODIUM CHLORIDE, POTASSIUM CHLORIDE, SODIUM LACTATE AND CALCIUM CHLORIDE: 600; 310; 30; 20 INJECTION, SOLUTION INTRAVENOUS at 08:07

## 2025-07-18 RX ADMIN — FENTANYL CITRATE 25 MCG: 50 INJECTION, SOLUTION INTRAMUSCULAR; INTRAVENOUS at 08:07

## 2025-07-18 RX ADMIN — ONDANSETRON 4 MG: 2 INJECTION INTRAMUSCULAR; INTRAVENOUS at 08:07

## 2025-07-18 RX ADMIN — PROPOFOL 50 MG: 10 INJECTION, EMULSION INTRAVENOUS at 08:07

## 2025-07-18 RX ADMIN — MIDAZOLAM 2 MG: 1 INJECTION INTRAMUSCULAR; INTRAVENOUS at 08:07

## 2025-07-18 RX ADMIN — PROPOFOL 50 MG: 10 INJECTION, EMULSION INTRAVENOUS at 09:07

## 2025-07-18 RX ADMIN — DEXAMETHASONE SODIUM PHOSPHATE 4 MG: 4 INJECTION, SOLUTION INTRA-ARTICULAR; INTRALESIONAL; INTRAMUSCULAR; INTRAVENOUS; SOFT TISSUE at 08:07

## 2025-07-18 RX ADMIN — PROPOFOL 25 MG: 10 INJECTION, EMULSION INTRAVENOUS at 09:07

## 2025-07-18 RX ADMIN — FENTANYL CITRATE 50 MCG: 50 INJECTION, SOLUTION INTRAMUSCULAR; INTRAVENOUS at 08:07

## 2025-07-18 RX ADMIN — CEFAZOLIN 2 G: 330 INJECTION, POWDER, FOR SOLUTION INTRAMUSCULAR; INTRAVENOUS at 08:07

## 2025-07-18 RX ADMIN — LIDOCAINE HYDROCHLORIDE 40 MG: 20 INJECTION INTRAVENOUS at 08:07

## 2025-07-18 NOTE — TRANSFER OF CARE
"Anesthesia Transfer of Care Note    Patient: Juju Cruz    Procedure(s) Performed: Procedure(s) (LRB):  Right carpal tunnel release and right dequervain's release (Right)    Patient location: PACU    Anesthesia Type: general    Transport from OR: Transported from OR on room air with adequate spontaneous ventilation    Post pain: adequate analgesia    Post assessment: no apparent anesthetic complications    Post vital signs: stable    Level of consciousness: awake    Nausea/Vomiting: no nausea/vomiting    Complications: none    Transfer of care protocol was followed      Last vitals: Visit Vitals  /85 (BP Location: Right arm, Patient Position: Sitting)   Pulse 90   Temp 36.9 °C (98.4 °F) (Temporal)   Resp 18   Ht 5' 2" (1.575 m)   Wt 63.6 kg (140 lb 1.6 oz)   LMP 08/03/2015   SpO2 95%   Breastfeeding No   BMI 25.63 kg/m²     "

## 2025-07-18 NOTE — DISCHARGE SUMMARY
[facility]  Discharge Note  Short Stay    Procedure(s) (LRB):  Right carpal tunnel release and right dequervain's release (Right)      OUTCOME: Patient tolerated treatment/procedure well without complication and is now ready for discharge.    DISPOSITION: Home or Self Care    FINAL DIAGNOSIS:  <principal problem not specified>    FOLLOWUP: In clinic    DISCHARGE INSTRUCTIONS:    Discharge Procedure Orders   Diet general     Keep surgical extremity elevated     Lifting restrictions     No driving, operating heavy equipment or signing legal documents while taking pain medication.     Leave dressing on - Keep it clean, dry, and intact until clinic visit     Call MD for:  temperature >100.4     Call MD for:  persistent nausea and vomiting     Call MD for:  severe uncontrolled pain     Call MD for:  difficulty breathing, headache or visual disturbances     Call MD for:  redness, tenderness, or signs of infection (pain, swelling, redness, odor or green/yellow discharge around incision site)     Call MD for:  hives     Call MD for:  persistent dizziness or light-headedness     Call MD for:  extreme fatigue        TIME SPENT ON DISCHARGE: 5 minutes

## 2025-07-18 NOTE — DISCHARGE INSTRUCTIONS
Discharge Instructions     Patient Name: Juju Cruz  Procedure: Procedure(s) (LRB):  Right carpal tunnel release and right dequervain's release (Right)     Surgeon: JANINA Madsen M.D.     Date of Surgery: 7/18/2025      Wound Care: Keep incision clean and dry until follow up.  May remove dressing in 72 hours and replace with band-aid.   Do not get wound wet!  Weight Bearing Status: Non-weight bearing to the operative extremity.  Activity: Please keep your operative arm elevated.  Please flex and extend your exposed fingers several times per hour.  This will help reduce swelling at the operative site. If the fingers are getting stiff move them more often.  Medication: Take these medications as directed. You should take pain medications with food.    Current Discharge Medication List        START taking these medications    Details   HYDROcodone-acetaminophen (NORCO) 5-325 mg per tablet Take 1 tablet by mouth every 6 (six) hours as needed for Pain.  Qty: 5 tablet, Refills: 0    Comments: Quantity prescribed more than 7 day supply? No  Associated Diagnoses: De Quervain's tenosynovitis, right             While on opioid (narcotic) pain medication, please refrain from:  Driving  Operating Heavy Machinery/Power Tools  Drinking alcohol  All narcotics can cause some degree of itching, sedation, constipation and nausea. You should take stool softeners to prevent constipation. These can be purchased over the counter.   Prescription refill requests are only accepted during normal business hours (Monday-Friday 8am-4pm) and may take up to 48 hours to fill.     If you have any of the following signs or symptoms please proceed to your nearest Emergency Room:   Chest Pain  Shortness of Breath/ Difficulty Breathing  Excessive Bleeding    Please call the office if you have the following:   Excessive swelling that doesn't improve with elevation  Foul smelling odor from your wounds or dressings  Discharge from your surgical  wounds  Persistent pain that does not get better with the prescription pain medication & elevation  Persistent nausea and/or vomiting  Fevers greater than 101.1 after the first 48 hours post op  Dramatic increase in post-operative pain    JANINA Madsen M.D.  [facility]  Orthopedic Hand and Wrist Surgeon    [facility]  Dr. Kofi Madsen   Google Review   Healthgrades   TweepsMap

## 2025-07-18 NOTE — ANESTHESIA POSTPROCEDURE EVALUATION
Anesthesia Post Evaluation    Patient: Juju Cruz    Procedure(s) Performed: Procedure(s) (LRB):  Right carpal tunnel release and right dequervain's release (Right)    Final Anesthesia Type: general      Patient location during evaluation: PACU  Patient participation: Yes- Able to Participate  Level of consciousness: awake and alert and oriented  Post-procedure vital signs: reviewed and stable  Pain management: adequate  Airway patency: patent    PONV status at discharge: No PONV  Anesthetic complications: no      Cardiovascular status: blood pressure returned to baseline, stable and hemodynamically stable  Respiratory status: unassisted  Hydration status: euvolemic  Follow-up not needed.              Vitals Value Taken Time   /65 07/18/25 09:45   Temp 36.7 °C (98 °F) 07/18/25 09:21   Pulse 85 07/18/25 09:45   Resp 19 07/18/25 09:45   SpO2 97 % 07/18/25 09:45         Event Time   Out of Recovery 09:40:00         Pain/Nicolette Score: Nicolette Score: 10 (7/18/2025  9:45 AM)

## 2025-07-18 NOTE — OP NOTE
JANINA Madsen M.D.  Orthopaedic Hand and Wrist Surgery  [facility]    OPERATIVE REPORT    Procedure Date: 7/18/2025     Patient Name: Juju Cruz     YOB: 1965    Pre-Operative Diagnosis:  right De Quervain's Disease  Right Carpal tunnel    Post-Operative Diagnosis:  right De Quervain's Disease  Right Carpal tunnel    Procedure Performed:  right De Quervain's release 15854  Right Carpal tunnel release 64039    Surgeon: JANINA Madsen MD    Assistant: None    Anesthesia: Monitor Anesthesia Care    Fluids: See Anesthesia Record    Urine Output: See Anesthesia Record    Estimated Blood Loss: * No values recorded between 7/18/2025  8:30 AM and 7/18/2025  9:18 AM *    Implants: * No implants in log *    Specimens:   Specimen (24h ago, onward)      None             Drains: None    Tourniquet Time:   Total Tourniquet Time Documented:  Arm  (Right) - 26 minutes  Total: Arm  (Right) - 26 minutes       Complications: No    Fluoro/C-arm utilized during the case: No    Loupes/Microscope: 3.5x Loupe magnification was utilized for this case    Indications for Procedure and Brief History:  Juju Cruz is a 60 y.o. female with R deq and R CTS    I had a long discussion with the patient about treatment and diagnostic options. We discussed operative and non-operative options and expected outcomes of their diagnosis and co-morbidities. We discussed the risks and benefits of surgery at length, including but not limited to pain, infection, bleeding, damage to adjacent structures such as nerves and blood vessels, failure of appropriate healing, stiffness, scarring, laxity, need for more surgery, stroke, blood clot, loss of life, loss of limb, need for removal of any implants used, anesthesia risks, breathing problems, and heart problems. We talked about common and uncommon risks. We discussed risks that were higher specific to the patient and their co-morbid conditions.  We discussed  expected treatment outcomes in regards to pain, function and the possibility of permanent impairment.  They expressed understanding of the risks and benefits an opted to proceed with surgical management.  The patient was consented and marked prior to transfer to the operating room.    Intra-Operative Findings:  There was complete release of the EPB and APL tendons from the 1st dorsal compartment while protecting the superficial radial nerve there was no tendon subluxation there was thickening of the retinaculum of the 1st dorsal compartment.  The median nerve was stenotic at the level of the TCL.  No transligamentous nerve    Description of Procedure: I met the patient in the preoperative holding area. I identified, confirmed, and marked the operative extremity. All questions were answered. The patient was then taken back to the operating room and transferred to the operative table. The patient was placed in the supine position. All bony prominences were padded. The operative extremity was then prepped and draped in the standard sterile fashion with alcohol and chlorhexidine solutions. A timeout was performed to ensure we had the proper patient, proper operative site, and were performing the proper procedure. All members of the operative team were in agreement with this.     A 25 gauge needle was used to inject a solution of 1% xylocaine and 0.5% Marcaine into the subcutaneous tissue.      Elevation was then used to exsanguinate the extremity and a forearm tourniquet was inflated to 250 torr.   A #15 blade was then used to make an incision starting 5 mm distal to the wrist flexion crease and extending distally for approximately 2.5 cm, in line with the ulnar border of the 4thray. Blunt dissection was carried through the subcutaneous tissue. Hemostasis was maintained with bipolar cautery. The palmar fascia was identified and split, in line with its fibers. With the use of Iwona and Ragnell retractors we were able to  visualize the transverse carpal ligament in its entirety. After ensuring there was no transligamentous branch of the median nerve in the area of the incision, a #15 blade was used to divide the transverse carpal ligament along its ulnar border, starting at the level of the hook of the hamate. The ligament was divided distally under direct visualization all the way out to the distal palmar fat, protecting the superficial arch. After the distal palmar fat was encountered our attention was then turned proximally. Again, under direct visualization, the proximal portion of the transverse carpal ligament was divided. At the proximal most aspect of the incision, a hemostat was utilized to develop a plane, both dorsal and volar to the forearm fascia, and under direct visualization, the distal aspect of the forearm fascia was divided. The median nerve was inspected and freed up from the radial and ulnar leaflets. There was stenosis of the median nerve underneath the transverse carpal ligament and hyperemia of the nerve. Hemostasis was maintained with bipolar cautery. The wound was copiously irrigated and then closed with 4-0 Prolene.        An incision was made in line with Tali's lines over the radial side of the wrist. Blunt dissection was carried through the subcutaneous tissue, identifying and protecting the branches of the superficial radial nerve. The retinaculum over the first compartment was identified and the dorsal aspect of the compartment was released with a #15 blade. The proximal and distal extents of the compartment were released under direct visualization, ensuring no injury to the superficial radial nerve. After ensuring release of both the APL and EPB tendons, the wound was copiously irrigated. The wrist was flexed and extended. There was no volar subluxation in the tendons. The wound was then closed with 4-0 Monocryl subcuticular stitch. A well-padded short arm splint was applied.    All sponge,  instrument, and needle counts were correct at the conclusion of the case.      Condition: Good    Disposition: PACU - hemodynamically stable.    Attestation: I was present and scrubbed for the entire procedure.    Post-Operative Exam:  The patient was examined post-operatively and the limb was warm and well perfused with appropriate neurovascular status relative to preoperative block status. The dressing was intact.      Post-Operative Plan:  Weight bearing: NWB  Range of motion: Encouraged  Dressing Change: none  Keep wound clean and dry  Antibiotics: none  DVT Ppx: ambulation  PT/OT: none  Follow-up: 10-12 days  Discharge Plan: Today    JANINA Madsen MD  Orthopaedic Hand and Wrist Surgery

## 2025-07-30 ENCOUNTER — OFFICE VISIT (OUTPATIENT)
Dept: ORTHOPEDICS | Facility: CLINIC | Age: 60
End: 2025-07-30
Payer: COMMERCIAL

## 2025-07-30 DIAGNOSIS — Z98.890 POST-OPERATIVE STATE: Primary | ICD-10-CM

## 2025-07-30 PROCEDURE — 99024 POSTOP FOLLOW-UP VISIT: CPT | Mod: S$GLB,,, | Performed by: ORTHOPAEDIC SURGERY

## 2025-07-30 PROCEDURE — 99999 PR PBB SHADOW E&M-EST. PATIENT-LVL II: CPT | Mod: PBBFAC,,, | Performed by: ORTHOPAEDIC SURGERY

## 2025-07-30 PROCEDURE — 1159F MED LIST DOCD IN RCRD: CPT | Mod: CPTII,S$GLB,, | Performed by: ORTHOPAEDIC SURGERY

## 2025-07-30 RX ORDER — TRAZODONE HYDROCHLORIDE 50 MG/1
TABLET ORAL
COMMUNITY
Start: 2025-07-24

## 2025-07-30 NOTE — PROGRESS NOTES
JANINA Madsen M.D.  Orthopaedic Hand and Wrist Surgery  [address]    Patient ID: Juju Cruz  YOB: 1965  MRN: [id number]    Date of Surgery:  2025    Procedure Performed:   Right De Quervain's release   Right Carpal tunnel release    History of Present Illness: Juju Cruz is a 60 y.o. female presents 12 days s/p the above procedure.  She is doing well denies any numbness and tingling she is very happy with the result    Patient was queried and this is the extent of the patients current complaints today.    Physical Exam:   Wound: healing well - sutures intact  Sensation: 2 point discrimination over the radial and ulnar aspects of all digits is 5mm, no decreased sensation of the superficial radial nerve  Motor: 5/5 apb and 1st dorsal interosseous. Intact EPL, FPL, FDP to all fingers  Swelling: as expected post-op  No decreased over the superficial radial nerve    Imaging:   No new imaging today    Provider Note/Medical Decision Makin. Post-operative state    Sutures removed in clinic today.    Do not submerge or soak wound for 2 weeks.  Showers, washing hands and running water is okay.  No lifting, pushing or pulling.  Start desensitization exercises.  Patient understands the risk of recurrence and/or stiffness and tendon subluxation.  Restrictions stated above in place until 6 weeks postop.  RTC as needed.    I discussed worrisome and red flag signs and symptoms with the patient. The patient expressed understanding and agreed to alert me immediately or to go to the emergency room if they experience any of these.   Treatment plan was developed with input from the patient/family, and they expressed understanding and agreement with the plan. All questions were answered today.    There are no Patient Instructions on file for this visit.    JANINA Madsen M.D.  [facility]  Orthopedic Hand and Wrist Surgeon    [place] [facility]  [facility]ist  Dr. Kofi Madsen   Google Review   Healthgrades   [employer]     Disclaimer: This note was prepared using a voice recognition system and is likely to have sound alike errors within the text.

## (undated) DEVICE — GLOVE SURGEONS ULTRA TOUCH 6.5

## (undated) DEVICE — DRAPE HAND STERILE

## (undated) DEVICE — ALCOHOL 70% ISOP RUBBING 4OZ

## (undated) DEVICE — PAD CAST SPECIALIST STRL 4

## (undated) DEVICE — UNDERGLOVE BIOGEL PI SZ 6.5 LF

## (undated) DEVICE — SUT PROLENE 4-0 MONO 18IN

## (undated) DEVICE — GAUZE CNFRM STRL 2INX4.1YD

## (undated) DEVICE — COVER CAMERA OPERATING ROOM

## (undated) DEVICE — KIT TURNOVER

## (undated) DEVICE — APPLICATOR CHLORAPREP ORN 26ML

## (undated) DEVICE — CORD BIPOLAR ELECTROSURGICAL

## (undated) DEVICE — DRESSING XEROFORM NONADH 1X8IN

## (undated) DEVICE — PACK BASIC SETUP SC BR

## (undated) DEVICE — TOURNIQUET SB QC DP 18X4IN

## (undated) DEVICE — GOWN POLY REINF BRTH SLV XL

## (undated) DEVICE — SCRUB HIBICLENS 4% CHG 4OZ

## (undated) DEVICE — BANDAGE ROLL COTTN 4.5INX4.1YD

## (undated) DEVICE — UNDERGLOVES BIOGEL PI SIZE 7.5

## (undated) DEVICE — SOL 9P NACL IRR PIC IL

## (undated) DEVICE — SUPPORT ULNA NERVE PROTECTOR

## (undated) DEVICE — NDL BLNT STD 1.5IN 18G

## (undated) DEVICE — BANDAGE ELASTIC 2X5 VELCRO ST

## (undated) DEVICE — SYR 10CC LUER LOCK

## (undated) DEVICE — POSITIONER HEAD DONUT 9IN FOAM

## (undated) DEVICE — GAUGE FLUFF X-SUPER 36X36 2PLY

## (undated) DEVICE — GLOVE SURG ULTRA TOUCH 7.5

## (undated) DEVICE — BANDAGE MATRIX HK LOOP 4IN 5YD

## (undated) DEVICE — DRAPE THREE-QTR REINF 53X77IN

## (undated) DEVICE — TOWEL OR DISP STRL BLUE 4/PK

## (undated) DEVICE — COVER LIGHT HANDLE 80/CA

## (undated) DEVICE — SPONGE COTTON TRAY 4X4IN